# Patient Record
Sex: MALE | Race: BLACK OR AFRICAN AMERICAN | Employment: OTHER | URBAN - METROPOLITAN AREA
[De-identification: names, ages, dates, MRNs, and addresses within clinical notes are randomized per-mention and may not be internally consistent; named-entity substitution may affect disease eponyms.]

---

## 2017-01-06 RX ORDER — SODIUM CHLORIDE 9 MG/ML
20 INJECTION, SOLUTION INTRAVENOUS CONTINUOUS
Status: CANCELLED | OUTPATIENT
Start: 2017-01-06

## 2017-01-09 ENCOUNTER — OFFICE VISIT (OUTPATIENT)
Dept: ONCOLOGY | Age: 62
End: 2017-01-09

## 2017-01-09 ENCOUNTER — HOSPITAL ENCOUNTER (OUTPATIENT)
Dept: ONCOLOGY | Age: 62
Discharge: HOME | End: 2017-01-09

## 2017-01-09 VITALS
TEMPERATURE: 98 F | WEIGHT: 197 LBS | DIASTOLIC BLOOD PRESSURE: 90 MMHG | SYSTOLIC BLOOD PRESSURE: 155 MMHG | BODY MASS INDEX: 27.58 KG/M2 | HEART RATE: 78 BPM | HEIGHT: 71 IN

## 2017-01-09 DIAGNOSIS — G89.3 CHRONIC PAIN DUE TO NEOPLASM: ICD-10-CM

## 2017-01-09 DIAGNOSIS — R63.0 LOSS OF APPETITE: ICD-10-CM

## 2017-01-09 DIAGNOSIS — C22.0 CANCER, HEPATOCELLULAR (HCC): ICD-10-CM

## 2017-01-09 DIAGNOSIS — C22.0 CANCER, HEPATOCELLULAR (HCC): Primary | ICD-10-CM

## 2017-01-09 DIAGNOSIS — C22.8 PRIMARY MALIGNANT NEOPLASM OF LIVER WITH METASTASIS FROM LIVER TO OTHER SITE (HCC): ICD-10-CM

## 2017-01-09 DIAGNOSIS — D64.9 CHRONIC ANEMIA: ICD-10-CM

## 2017-01-09 LAB
BASO+EOS+MONOS # BLD AUTO: 0.3 K/UL (ref 0–2.3)
BASO+EOS+MONOS # BLD AUTO: 8 % (ref 0.1–17)
DIFFERENTIAL METHOD BLD: ABNORMAL
ERYTHROCYTE [DISTWIDTH] IN BLOOD BY AUTOMATED COUNT: 16.6 % (ref 11.5–14.5)
HCT VFR BLD AUTO: 32.8 % (ref 36–48)
HGB BLD-MCNC: 9.6 G/DL (ref 12–16)
LYMPHOCYTES # BLD AUTO: 15 % (ref 14–44)
LYMPHOCYTES # BLD: 0.6 K/UL (ref 1.1–5.9)
MCH RBC QN AUTO: 24.1 PG (ref 25–35)
MCHC RBC AUTO-ENTMCNC: 29.3 G/DL (ref 31–37)
MCV RBC AUTO: 82.4 FL (ref 78–102)
NEUTS SEG # BLD: 3 K/UL (ref 1.8–9.5)
NEUTS SEG NFR BLD AUTO: 77 % (ref 40–70)
PLATELET # BLD AUTO: 153 K/UL (ref 140–440)
RBC # BLD AUTO: 3.98 M/UL (ref 4.1–5.1)
WBC # BLD AUTO: 3.9 K/UL (ref 4.5–13)

## 2017-01-09 RX ORDER — FENTANYL 100 UG/H
1 PATCH TRANSDERMAL
Qty: 10 PATCH | Refills: 0 | Status: SHIPPED | OUTPATIENT
Start: 2017-01-09 | End: 2017-02-09 | Stop reason: SDUPTHER

## 2017-01-09 RX ORDER — FENTANYL 50 UG/1
1 PATCH TRANSDERMAL
Qty: 10 PATCH | Refills: 0 | Status: SHIPPED | OUTPATIENT
Start: 2017-01-09 | End: 2017-02-09 | Stop reason: SDUPTHER

## 2017-01-09 RX ORDER — HYDROMORPHONE HYDROCHLORIDE 4 MG/1
4 TABLET ORAL
Qty: 180 TAB | Refills: 0 | Status: SHIPPED | OUTPATIENT
Start: 2017-01-09 | End: 2017-02-09 | Stop reason: SDUPTHER

## 2017-01-09 NOTE — PROGRESS NOTES
Hematology/Oncology  Progress Note    Name: Rivas Enrique  Date: 2017  : 1955    PCP: Scar Bourne MD     Mr. Caridad Dykes is a 59 y.o.  male who was seen for management of his progressive hepatocellular carcinoma. Current therapy: Supportive care and radiofrequency ablation      Subjective:      Mr. Brie Schrader is a 69-year-old -American man who has slowly progressive advanced hepatocellular carcinoma. He has previously been treated with Nexavar. This medicine was discontinued due to the progressive decline in his cardiac function. I recently referred him to the interventional radiologist to see if he would be a candidate for radiofrequency ablation. He has had one treatment. His subsequent treatments have been rescheduled. Today he is complaining of progressive pain. He had been using 150 µg of Duragesic. The patient reports that he is out of his pain medication and is requesting a new prescription for his Duragesic and for his Dilaudid tablets which he takes for breakthrough pain as well. Additionally, the patient reports that his appetite is poor but readily admits that he has not been using any nutritional supplements. Past medical history, family history, and social history: these were reviewed and remains unchanged.     Past Medical History   Diagnosis Date    Acid reflux     Angina effort ([address] 10/20/2015     rare, sply when misses meds and BP high     Cancer, hepatocellular ([address] 3/6/2015    Coronary artery disease     Coronary atherosclerosis of native coronary artery      RCA PCI, NOT ON STATIN DUE TO HEP C AND ELEVATED LFT    Essential hypertension     Essential hypertension, benign      ELEVATED/PT HAS NOT TAKEN MEDS TODAY    Other and unspecified angina pectoris ([address]      STABLE NOW, FEELS BETTER    Postsurgical percutaneous transluminal coronary angioplasty status      POST RCA JAMES/2011, D/C EFFIENT    Type II or unspecified type diabetes mellitus without mention of complication, not stated as uncontrolled      Past Surgical History   Procedure Laterality Date    Hx coronary stent placement  2006     RCA STENT, FOR SINGLE VESSEL CAD    Hx coronary stent placement  2011     RCA: JAMES    Hx tonsillectomy      Hx heart catheterization       Social History     Social History    Marital status: SINGLE     Spouse name: N/A    Number of children: N/A    Years of education: N/A     Occupational History    Not on file. Social History Main Topics    Smoking status: Former Smoker     Quit date: 4/23/2006    Smokeless tobacco: Never Used      Comment: REMOTELY QUIT TOBACCO USE    Alcohol use No      Comment: REMOTELY QUIT ALCHOL USE    Drug use: No    Sexual activity: Not on file     Other Topics Concern    Not on file     Social History Narrative     Family History   Problem Relation Age of Onset    Heart Disease Father      MI    Heart Attack Father 79    Hypertension Other     Diabetes Other     Stroke Neg Hx      Current Outpatient Prescriptions   Medication Sig Dispense Refill    isosorbide mononitrate ER (IMDUR) 60 mg CR tablet Take 1 Tab by mouth every morning. 30 Tab 3    fentaNYL (DURAGESIC) 100 mcg/hr PATCH 1 Patch by TransDERmal route every seventy-two (72) hours. Max Daily Amount: 1 Patch. 5 Patch 0    fentaNYL (DURAGESIC) 50 mcg/hr PATCH 1 Patch by TransDERmal route every seventy-two (72) hours. Max Daily Amount: 1 Patch. Use 1- 100 µg patch in combination with 1-50 µg patch for a total dose of 150 µg every 72 hours 5 Patch 0    HYDROmorphone (DILAUDID) 4 mg tablet Take 1 Tab by mouth every four (4) hours as needed for Pain. Max Daily Amount: 24 mg. 180 Tab 0    hydrALAZINE (APRESOLINE) 50 mg tablet Take 1 Tab by mouth two (2) times a day. Hold if SBP<120 60 Tab 3    metoprolol tartrate (LOPRESSOR) 100 mg IR tablet Take 1 Tab by mouth two (2) times a day.  60 Tab 3    clopidogrel (PLAVIX) 75 mg tablet TAKE 1 TABLET (75 MG) BY ORAL ROUTE ONCE DAILY 30 Tab 11    aspirin delayed-release 81 mg tablet 162 mg.  b complex-vitamin c-folic acid (NEPHROCAPS) 1 mg capsule Take 1 Cap by Mouth Once a Day.  oxyCODONE-acetaminophen (PERCOCET) 5-325 mg per tablet Take 1 Tab by Mouth Every 4 Hours As Needed for Pain. Do not exceed 4000 mg of acetaminophen per day.  ezetimibe (ZETIA) 10 mg tablet 10 mg.      gabapentin (NEURONTIN) 300 mg capsule 600 mg.  hydroCHLOROthiazide (HYDRODIURIL) 50 mg tablet 50 mg.      isosorbide mononitrate ER (IMDUR) 60 mg CR tablet 60 mg.      losartan (COZAAR) 100 mg tablet 100 mg.      NIFEdipine ER (PROCARDIA XL) 60 mg ER tablet 60 mg.      oxyCODONE IR (OXY-IR) 15 mg immediate release tablet 15 mg.  pantoprazole (PROTONIX) 40 mg tablet 40 mg.      SORAfenib (NEXAVAR) 200 mg tablet 200 mg.  penicillin v potassium (VEETID) 500 mg tablet   0    fentaNYL (DURAGESIC) 25 mcg/hr PATCH 1 Patch by TransDERmal route every seventy-two (72) hours. Max Daily Amount: 1 Patch. 10 Patch 0    HYDROmorphone (DILAUDID) 2 mg tablet Take 1 Tab by mouth every four (4) hours as needed for Pain. Max Daily Amount: 12 mg. 180 Tab 0    HYDROmorphone (DILAUDID) 2 mg tablet Take  by mouth every four (4) hours as needed for Pain.  cephALEXin (KEFLEX) 250 mg capsule Take 500 mg by mouth four (4) times daily.  oxyCODONE IR (ROXICODONE) 5 mg immediate release tablet Take 5 mg by mouth every four (4) hours as needed for Pain.  cloNIDine HCl (CATAPRES) 0.1 mg tablet Take 1 Tab by mouth nightly. Hold if SBP<120 30 Tab 1    aspirin delayed-release 81 mg tablet Take  by mouth daily. 2 tabs daily      nitroglycerin (NITROSTAT) 0.4 mg SL tablet 1 Tab by SubLINGual route every five (5) minutes as needed for Chest Pain for up to 3 doses. 25 Tab 0    rosuvastatin (CRESTOR) 5 mg tablet Take 1 Tab by mouth nightly. 30 Tab 6    b complex-vitamin c-folic acid (NEPHROCAPS) 1 mg capsule Take 1 Cap by mouth daily.       fentaNYL (DURAGESIC) 100 mcg/hr PATCH 1 Patch by TransDERmal route every seventy-two (72) hours.  gabapentin (NEURONTIN) 300 mg capsule Take 300 mg by mouth two (2) times a day.  NIFEDICAL XL 60 mg ER tablet TAKE 1 TABLET (60 MG) BY ORAL ROUTE ONCE DAILY 30 Tab 3    pantoprazole (PROTONIX) 40 mg tablet Take 40 mg by mouth daily. 30 minutes before breakfast      SORAfenib (NEXAVAR) 200 mg tablet Take  by mouth two (2) times a day.  losartan (COZAAR) 100 mg tablet Take 1 Tab by mouth daily. 30 Tab 6    ezetimibe (ZETIA) 10 mg tablet Take  by mouth.  hydrochlorothiazide (HYDRODIURIL) 50 mg tablet Take 50 mg by mouth daily. Review of Systems  Constitutional: The patient has complaints of some fatigue and weakness due to the cancer associated pain. HEENT: The patient denies recent head trauma, eye pain, blurred vision,  hearing deficit, oropharyngeal mucosal pain or lesions, and the patient denies throat pain or discomfort. Lymphatics: The patient denies palpable peripheral lymphadenopathy. Hematologic: The patient denies having bruising, bleeding, or progressive fatigue. Respiratory: Patient denies having shortness of breath, cough, sputum production, fever, or dyspnea on exertion. Cardiovascular: The patient denies having leg pain, leg swelling, heart palpitations, chest permit, chest pain, or lightheadedness. The patient denies having dyspnea on exertion. Gastrointestinal: The patient denies having nausea, emesis, or diarrhea. The patient denies having any hematemesis or blood in the stool. He is experiencing slowly progressive abdominal pain due to the progressive nature of his advanced hepatocellular carcinoma. Genitourinary: Patient denies having urinary urgency, frequency, or dysuria. The patient denies having blood in the urine. Psychological: The patient denies having symptoms of nervousness, anxiety, depression, or thoughts of harming self.   Skin: Patient denies having skin rashes, skin, ulcerations, or unexplained itching or pruritus. Musculoskeletal: The patient denies having pain in the joints or bones. Objective:     Visit Vitals    /90    Pulse 78    Temp 98 °F (36.7 °C)    Ht 5' 11\" (1.803 m)    Wt 89.4 kg (197 lb)    BMI 27.48 kg/m2     ECOG PS=1, Pain score=4/10  Physical Exam:   Gen. Appearance: The patient is in no acute distress. Skin: There is no bruise or rash. HEENT: The exam is unremarkable. Neck: Supple without lymphadenopathy or thyromegaly. Lungs: Clear to auscultation and percussion; there are no wheezes or rhonchi. Heart: Regular rate and rhythm; there are no murmurs, gallops, or rubs. Abdomen: Bowel sounds are present and normal.  There is no guarding, tenderness, or hepatosplenomegaly. Extremities: There is no clubbing, cyanosis, or edema. Neurologic: There are no focal neurologic deficits. Lymphatics: There is no palpable peripheral lymphadenopathy. Musculoskeletal: The patient has full range of motion at all joints. There is no evidence of joint deformity or effusions. There is no focal joint tenderness. Psychological/psychiatric: There is no clinical evidence of anxiety, depression, or melancholy. Lab data:      Results for orders placed or performed during the hospital encounter of 01/09/17   CBC WITH 3 PART DIFF     Status: Abnormal   Result Value Ref Range Status    WBC 3.9 (L) 4.5 - 13.0 K/uL Final    RBC 3.98 (L) 4.10 - 5.10 M/uL Final    HGB 9.6 (L) 12.0 - 16.0 g/dL Final    HCT 32.8 (L) 36 - 48 % Final    MCV 82.4 78 - 102 FL Final    MCH 24.1 (L) 25.0 - 35.0 PG Final    MCHC 29.3 (L) 31 - 37 g/dL Final    RDW 16.6 (H) 11.5 - 14.5 % Final    PLATELET 773 978 - 383 K/uL Final    NEUTROPHILS 77 (H) 40 - 70 % Final    MIXED CELLS 8 0.1 - 17 % Final    LYMPHOCYTES 15 14 - 44 % Final    ABS. NEUTROPHILS 3.0 1.8 - 9.5 K/UL Final    ABS. MIXED CELLS 0.3 0.0 - 2.3 K/uL Final    ABS.  LYMPHOCYTES 0.6 (L) 1.1 - 5.9 K/UL Final Comment: [facility] Results Reviewed by Medical Director. DF AUTOMATED   Final           Assessment:     1. Cancer, hepatocellular ([facility]    2. Primary malignant neoplasm of liver with metastasis from liver to other site [facility])    3. Chronic pain due to neoplasm    4. Chronic anemia    5. Loss of appetite      Plan:   Advanced hepatocellular carcinoma/primary malignant neoplasm of the liver with metastasis from the liver to other sites: The patient has been referred to the interventional radiologist and is currently undergoing radiofrequency ablation. He did complete one of his treatment and subsequent treatments have been rescheduled. I will check his comprehensive metabolic panel and alpha-fetoprotein levels at this time. I have encouraged the patient to complete the treatment as recommended by the interventional radiologist.    Cancer associated pain: At this time I will provide him with 150 µg every 72 hours of Duragesic. Dilaudid 4 mg tablets will be provided at this time with instruction to take 1 tablet every 4 hours when necessary as needed as well. Chronic anemia: Have explained to the patient that his CBC today shows that his hemoglobin is 9.6 g/dL with hematocrit of 32.8%. I will check his iron profile and ferritin levels at this time. Pending the results of this test the patient may need to begin intravenous iron therapy. Loss of appetite: I have explained to the patient that he should begin using nutritional supplements between meals such as boost or Ensure. If over the next 6 weeks there is no significant improvement we will at Marinol 5 mg p.o. twice daily to his treatment regimen. Follow-up will occur in 6 weeks.   Orders Placed This Encounter    COMPLETE CBC & AUTO DIFF WBC    InHouse CBC ([place])     Standing Status:   Future     Number of Occurrences:   1     Standing Expiration Date:   1/16/2017    FERRITIN     Standing Status: Future     Standing Expiration Date:   0/66/0891    METABOLIC PANEL, COMPREHENSIVE     Standing Status:   Future     Standing Expiration Date:   1/10/2018    IRON PROFILE     Standing Status:   Future     Standing Expiration Date:   1/10/2018    AFP, TUMOR MARKER     Standing Status:   Future     Standing Expiration Date:   1/10/2018     Order Specific Question:   Patient Race? Answer:   Black     Order Specific Question:   Patient Weight     Answer:   89.4       Batool Modi MD  1/9/2017      Please note: This document has been produced using voice recognition software. Unrecognized errors in transcription may be present.

## 2017-01-09 NOTE — MR AVS SNAPSHOT
Visit Information Date & Time Provider Department Dept. Phone Encounter #  
 1/9/2017  2:30 PM Reji Lopez MD [facility] [phone] Follow-up Instructions Return in about 6 weeks (around 2/20/2017). Your Appointments 1/24/2017  2:30 PM  
ESTABLISHED PATIENT with Murphy Sandhu MD  
[facility] Appt Note: 3 month follow up with Dr Naima Murphy; 3 month follow up with Dr Merline Donna r/s weather [address] [facility] [address]  
  
   
 [address] [phone] [place] [address]  
  
    
 2/20/2017  2:45 PM  
Office Visit with Reji Lopez MD  
[address] [place] [address]) Appt Note: 6 wk fu  
 [address], [place] [address] [facility] [address] [facility]  
  
   
 [facility] [address] [facility] Upcoming Health Maintenance Date Due HEMOGLOBIN A1C Q6M 1955 FOOT EXAM Q1 9/9/1963 MICROALBUMIN Q1 9/9/1963 EYE EXAM RETINAL OR DILATED Q1 9/9/1963 Pneumococcal 19-64 Highest Risk (1 of 3 - PCV13) 9/9/1972 DTaP/Tdap/Td series (1 - Tdap) 9/9/1974 FOBT Q 1 YEAR AGE 50-75 9/9/2003 ZOSTER VACCINE AGE 60> 9/9/2013 LIPID PANEL Q1 4/25/2015 INFLUENZA AGE 9 TO ADULT 8/1/2016 Allergies as of 1/9/2017  Review Complete On: 12/13/2016 By: Karis Rizo Severity Noted Reaction Type Reactions Lipitor [Atorvastatin]    Unable to Obtain Other reaction(s): other/intolerance Other reaction(s): other/intolerance Current Immunizations  Never Reviewed No immunizations on file. Not reviewed this visit You Were Diagnosed With   
  
 Codes Comments Cancer, hepatocellular ([facility]    -  Primary ICD-10-CM: C22.0 ICD-9-CM: 155.0 Primary malignant neoplasm of liver with metastasis from liver to other site [facility])     ICD-10-CM: C22.8 ICD-9-CM: 155.0 Chronic pain due to neoplasm     ICD-10-CM: G89.3 ICD-9-CM: 338. 3 Chronic anemia     ICD-10-CM: D64.9 ICD-9-CM: 285.9 Loss of appetite     ICD-10-CM: R63.0 ICD-9-CM: 897. 0 Vitals BP Pulse Temp Height(growth percentile) Weight(growth percentile) BMI  
 155/90 78 98 °F (36.7 °C) 5' 11\" (1.803 m) 197 lb (89.4 kg) 27.48 kg/m2 Smoking Status Former Smoker BMI and BSA Data Body Mass Index Body Surface Area  
 27.48 kg/m 2 2.12 m 2 Preferred Pharmacy Pharmacy Name Phone [id number] [address] [phone] Your Updated Medication List  
  
   
This list is accurate as of: 1/9/17  3:09 PM.  Always use your most recent med list.  
  
  
  
  
 * aspirin delayed-release 81 mg tablet Take  by mouth daily. 2 tabs daily * aspirin delayed-release 81 mg tablet 162 mg.  
  
 * b complex-vitamin c-folic acid 1 mg capsule Commonly known as:  Sharon Sacks Take 1 Cap by mouth daily. * b complex-vitamin c-folic acid 1 mg capsule Commonly known as:  Sharon Sacks Take 1 Cap by Mouth Once a Day. cephALEXin 250 mg capsule Commonly known as:  Gray Balboa Take 500 mg by mouth four (4) times daily. cloNIDine HCl 0.1 mg tablet Commonly known as:  CATAPRES Take 1 Tab by mouth nightly. Hold if SBP<120  
  
 clopidogrel 75 mg Tab Commonly known as:  PLAVIX TAKE 1 TABLET (75 MG) BY ORAL ROUTE ONCE DAILY * fentaNYL 100 mcg/hr PATCH Commonly known as:  DURAGESIC  
1 Patch by TransDERmal route every seventy-two (72) hours. * fentaNYL 25 mcg/hr PATCH Commonly known as:  DURAGESIC  
1 Patch by TransDERmal route every seventy-two (72) hours. Max Daily Amount: 1 Patch. * fentaNYL 100 mcg/hr PATCH Commonly known as:  DURAGESIC  
1 Patch by TransDERmal route every seventy-two (72) hours. Max Daily Amount: 1 Patch.   
  
 * fentaNYL 50 mcg/hr PATCH  
 Commonly known as:  DURAGESIC  
1 Patch by TransDERmal route every seventy-two (72) hours. Max Daily Amount: 1 Patch. Use 1- 100 g patch in combination with 1-50 g patch for a total dose of 150 g every 72 hours * gabapentin 300 mg capsule Commonly known as:  NEURONTIN Take 300 mg by mouth two (2) times a day. * gabapentin 300 mg capsule Commonly known as:  NEURONTIN  
600 mg.  
  
 hydrALAZINE 50 mg tablet Commonly known as:  APRESOLINE Take 1 Tab by mouth two (2) times a day. Hold if SBP<120  
  
 * hydroCHLOROthiazide 50 mg tablet Commonly known as:  HYDRODIURIL Take 50 mg by mouth daily. * hydroCHLOROthiazide 50 mg tablet Commonly known as:  HYDRODIURIL  
50 mg.  
  
 * HYDROmorphone 2 mg tablet Commonly known as:  DILAUDID Take  by mouth every four (4) hours as needed for Pain. * HYDROmorphone 2 mg tablet Commonly known as:  DILAUDID Take 1 Tab by mouth every four (4) hours as needed for Pain. Max Daily Amount: 12 mg.  
  
 * HYDROmorphone 4 mg tablet Commonly known as:  DILAUDID Take 1 Tab by mouth every four (4) hours as needed for Pain. Max Daily Amount: 24 mg.  
  
 * isosorbide mononitrate ER 60 mg CR tablet Commonly known as:  IMDUR  
60 mg.  
  
 * isosorbide mononitrate ER 60 mg CR tablet Commonly known as:  IMDUR Take 1 Tab by mouth every morning. * losartan 100 mg tablet Commonly known as:  COZAAR  
100 mg.  
  
 * losartan 100 mg tablet Commonly known as:  COZAAR Take 1 Tab by mouth daily. metoprolol tartrate 100 mg IR tablet Commonly known as:  LOPRESSOR Take 1 Tab by mouth two (2) times a day. * NIFEDICAL XL 60 mg ER tablet Generic drug:  NIFEdipine ER  
TAKE 1 TABLET (60 MG) BY ORAL ROUTE ONCE DAILY  
  
 * NIFEdipine ER 60 mg ER tablet Commonly known as:  PROCARDIA XL  
60 mg.  
  
 nitroglycerin 0.4 mg SL tablet Commonly known as:  NITROSTAT 1 Tab by SubLINGual route every five (5) minutes as needed for Chest Pain for up to 3 doses. * oxyCODONE IR 5 mg immediate release tablet Commonly known as:  Ralph Fuelling Take 5 mg by mouth every four (4) hours as needed for Pain. * oxyCODONE IR 15 mg immediate release tablet Commonly known as:  OXY-IR  
15 mg.  
  
 * pantoprazole 40 mg tablet Commonly known as:  PROTONIX Take 40 mg by mouth daily. 30 minutes before breakfast  
  
 * pantoprazole 40 mg tablet Commonly known as:  PROTONIX  
40 mg.  
  
 penicillin v potassium 500 mg tablet Commonly known as:  VEETID PERCOCET 5-325 mg per tablet Generic drug:  oxyCODONE-acetaminophen Take 1 Tab by Mouth Every 4 Hours As Needed for Pain. Do not exceed 4000 mg of acetaminophen per day. rosuvastatin 5 mg tablet Commonly known as:  CRESTOR Take 1 Tab by mouth nightly. * SORAfenib 200 mg tablet Commonly known as:  Bo Pennant Take  by mouth two (2) times a day. * SORAfenib 200 mg tablet Commonly known as:  NEXAVAR  
200 mg.  
  
 * ZETIA 10 mg tablet Generic drug:  ezetimibe Take  by mouth. * ezetimibe 10 mg tablet Commonly known as:  [place] Carbo 10 mg.  
  
 * Notice: This list has 29 medication(s) that are the same as other medications prescribed for you. Read the directions carefully, and ask your doctor or other care provider to review them with you. We Performed the Following COMPLETE CBC & AUTO DIFF WBC [47346 CPT(R)] Follow-up Instructions Return in about 6 weeks (around 2/20/2017). To-Do List   
 01/09/2017 Lab:  AFP, TUMOR MARKER   
  
 01/09/2017 Lab:  CBC WITH 3 PART DIFF   
  
 01/09/2017 Lab:  FERRITIN   
  
 01/09/2017 Lab:  IRON PROFILE   
  
 01/09/2017 Lab:  METABOLIC PANEL, COMPREHENSIVE   
  
 01/10/2017 6:00 AM  
  Appointment with [address] at [facility]  [place] [facility] ([phone]) MEDICATIONS  - Patient must bring a complete list of all medications currently taking to include prescriptions, over-the-counter meds, herbals, vitamins & any dietary supplements 01/10/2017  9:00 AM  
  Appointment with [facility] CATH HOLDING; [facility] ANGIO RADIOLOGIST; [facility] IR RM 1 at [facility] RAD [address] IR ([phone]) DIET INSTRUCTIONS - NPO - Nothing to eat or drink after midnight before the day of your exam.  You may take small sips of water with your medications unless instructed otherwise. GENERAL INSTRUCTIONS - You must have someone to drive you home after the procedure, unless instructed otherwise. MEDICATIONS - Bring a complete list of all your medications including prescriptions, over-the-counter meds, herbals, vitamins & dietary supplements. QUESTIONS Notify the Angio department in which your are scheduled. -Elia Fuller. #[address] [facility] Jose Marie [phone] Introducing [facility]! Conchita Yepez introduces [employer] patient portal. Now you can access parts of your medical record, email your doctor's office, and request medication refills online. 1. In your internet browser, go to [email or web address] 2. Click on the First Time User? Click Here link in the Sign In box. You will see the New Member Sign Up page. 3. Enter your [employer] Access Code exactly as it appears below. You will not need to use this code after youve completed the sign-up process. If you do not sign up before the expiration date, you must request a new code. · [employer] Access Code: [email or web address] Expires: 2/16/2017 12:42 PM 
 
4. Enter the last four digits of your Social Security Number (xxxx) and Date of Birth (mm/dd/yyyy) as indicated and click Submit. You will be taken to the next sign-up page. 5. Create a [employer] ID. This will be your [employer] login ID and cannot be changed, so think of one that is secure and easy to remember. 6. Create a [employer] password. You can change your password at any time. 7. Enter your Password Reset Question and Answer. This can be used at a later time if you forget your password. 8. Enter your e-mail address. You will receive e-mail notification when new information is available in [address] 9. Click Sign Up. You can now view and download portions of your medical record. 10. Click the Download Summary menu link to download a portable copy of your medical information. If you have questions, please visit the Frequently Asked Questions section of the [employer] website. Remember, [employer] is NOT to be used for urgent needs. For medical emergencies, dial 911. Now available from your iPhone and Android! Please provide this summary of care documentation to your next provider. Your primary care clinician is listed as Morris Alvarez. If you have any questions after today's visit, please call [phone].

## 2017-01-10 ENCOUNTER — HOSPITAL ENCOUNTER (OUTPATIENT)
Dept: INTERVENTIONAL RADIOLOGY/VASCULAR | Age: 62
Discharge: HOME | End: 2017-01-10
Attending: RADIOLOGY | Admitting: RADIOLOGY

## 2017-01-10 ENCOUNTER — APPOINTMENT (OUTPATIENT)
Dept: NUCLEAR MEDICINE | Age: 62
End: 2017-01-10
Attending: RADIOLOGY

## 2017-01-10 DIAGNOSIS — C22.0 LIVER CARCINOMA (HCC): ICD-10-CM

## 2017-01-10 LAB
AFP-TM SERPL-MCNC: 31.7 NG/ML (ref 0–8.3)
ALBUMIN SERPL-MCNC: 4 G/DL (ref 3.6–4.8)
ALBUMIN/GLOB SERPL: 1 {RATIO} (ref 1.1–2.5)
ALP SERPL-CCNC: 75 IU/L (ref 39–117)
ALT SERPL-CCNC: 22 IU/L (ref 0–44)
AST SERPL-CCNC: 45 IU/L (ref 0–40)
BILIRUB SERPL-MCNC: 0.3 MG/DL (ref 0–1.2)
BUN SERPL-MCNC: 19 MG/DL (ref 8–27)
BUN/CREAT SERPL: 21 (ref 10–22)
CALCIUM SERPL-MCNC: 9.4 MG/DL (ref 8.6–10.2)
CHLORIDE SERPL-SCNC: 97 MMOL/L (ref 96–106)
CO2 SERPL-SCNC: 28 MMOL/L (ref 18–29)
CREAT SERPL-MCNC: 0.9 MG/DL (ref 0.76–1.27)
FERRITIN SERPL-MCNC: 91 NG/ML (ref 30–400)
GLOBULIN SER CALC-MCNC: 4.1 G/DL (ref 1.5–4.5)
GLUCOSE SERPL-MCNC: 95 MG/DL (ref 65–99)
IRON SATN MFR SERPL: 11 % (ref 15–55)
IRON SERPL-MCNC: 34 UG/DL (ref 38–169)
POTASSIUM SERPL-SCNC: 4.8 MMOL/L (ref 3.5–5.2)
PROT SERPL-MCNC: 8.1 G/DL (ref 6–8.5)
SODIUM SERPL-SCNC: 139 MMOL/L (ref 134–144)
TIBC SERPL-MCNC: 321 UG/DL (ref 250–450)
UIBC SERPL-MCNC: 287 UG/DL (ref 111–343)

## 2017-01-10 NOTE — H&P
OUTPATIENT HISTORY AND PHYSICAL      Today 1/10/2017     Indication/Symptoms:   Angelica Longoria is a 59 y.o. male here for palliative radioembolization of complex multifocal HCC. Patient however was 2 hours late and continued taking Plavix despite multiple contact calls by IR nurse. Patient also stated that he has run out of most of his pain medications. Therefore, procedure was canceled and will be rescheduled with appropriate  Plavix washout and pain control. Patient agreed and understood the above plan. Current Meds:    Prior to Admission medications    Medication Sig Start Date End Date Taking? Authorizing Provider   fentaNYL (DURAGESIC) 50 mcg/hr PATCH 1 Patch by TransDERmal route every seventy-two (72) hours. Max Daily Amount: 1 Patch. Use 1- 100 µg patch in combination with 1-50 µg patch for a total dose of 150 µg every 72 hours 1/9/17   Mode Ramirez MD   fentaNYL (DURAGESIC) 100 mcg/hr PATCH 1 Patch by TransDERmal route every seventy-two (72) hours. Max Daily Amount: 1 Patch. 1/9/17   Mode Ramirez MD   HYDROmorphone (DILAUDID) 4 mg tablet Take 1 Tab by mouth every four (4) hours as needed for Pain. Max Daily Amount: 24 mg. 1/9/17   Mode Ramirez MD   isosorbide mononitrate ER (IMDUR) 60 mg CR tablet Take 1 Tab by mouth every morning. 12/29/16   Margaret Logan NP   hydrALAZINE (APRESOLINE) 50 mg tablet Take 1 Tab by mouth two (2) times a day. Hold if SBP<120 11/30/16   Shawna Gallegos MD   metoprolol tartrate (LOPRESSOR) 100 mg IR tablet Take 1 Tab by mouth two (2) times a day. 11/30/16   Shawna Gallegos MD   clopidogrel (PLAVIX) 75 mg tablet TAKE 1 TABLET (75 MG) BY ORAL ROUTE ONCE DAILY 10/26/16   Mera Altamirano MD   aspirin delayed-release 81 mg tablet 162 mg. Historical Provider   b complex-vitamin c-folic acid (NEPHROCAPS) 1 mg capsule Take 1 Cap by Mouth Once a Day.     Historical Provider   oxyCODONE-acetaminophen (PERCOCET) 5-325 mg per tablet Take 1 Tab by Mouth Every 4 Hours As Needed for Pain. Do not exceed 4000 mg of acetaminophen per day. 9/7/16   Historical Provider   ezetimibe (ZETIA) 10 mg tablet 10 mg. Historical Provider   gabapentin (NEURONTIN) 300 mg capsule 600 mg. Historical Provider   hydroCHLOROthiazide (HYDRODIURIL) 50 mg tablet 50 mg. 6/20/16   Historical Provider   isosorbide mononitrate ER (IMDUR) 60 mg CR tablet 60 mg. Historical Provider   losartan (COZAAR) 100 mg tablet 100 mg. Historical Provider   NIFEdipine ER (PROCARDIA XL) 60 mg ER tablet 60 mg. 6/20/16   Historical Provider   oxyCODONE IR (OXY-IR) 15 mg immediate release tablet 15 mg. 9/30/16   Historical Provider   pantoprazole (PROTONIX) 40 mg tablet 40 mg. Historical Provider   SORAfenib (NEXAVAR) 200 mg tablet 200 mg. Historical Provider   penicillin v potassium (VEETID) 500 mg tablet  9/7/16   Historical Provider   fentaNYL (DURAGESIC) 25 mcg/hr PATCH 1 Patch by TransDERmal route every seventy-two (72) hours. Max Daily Amount: 1 Patch. 10/24/16   Malika Rebolledo MD   HYDROmorphone (DILAUDID) 2 mg tablet Take 1 Tab by mouth every four (4) hours as needed for Pain. Max Daily Amount: 12 mg. 10/24/16   Malika Rebolledo MD   HYDROmorphone (DILAUDID) 2 mg tablet Take  by mouth every four (4) hours as needed for Pain. Historical Provider   cephALEXin (KEFLEX) 250 mg capsule Take 500 mg by mouth four (4) times daily. Historical Provider   oxyCODONE IR (ROXICODONE) 5 mg immediate release tablet Take 5 mg by mouth every four (4) hours as needed for Pain. Historical Provider   cloNIDine HCl (CATAPRES) 0.1 mg tablet Take 1 Tab by mouth nightly. Hold if SBP<120 8/23/16   Jacklyn Jara MD   aspirin delayed-release 81 mg tablet Take  by mouth daily. 2 tabs daily    Historical Provider   nitroglycerin (NITROSTAT) 0.4 mg SL tablet 1 Tab by SubLINGual route every five (5) minutes as needed for Chest Pain for up to 3 doses.  7/13/16   Jacklyn Jara MD   rosuvastatin (CRESTOR) 5 mg tablet Take 1 Tab by mouth nightly. 7/13/16   Bhupendra White MD   b complex-vitamin c-folic acid (NEPHROCAPS) 1 mg capsule Take 1 Cap by mouth daily. Historical Provider   fentaNYL (DURAGESIC) 100 mcg/hr PATCH 1 Patch by TransDERmal route every seventy-two (72) hours. Historical Provider   gabapentin (NEURONTIN) 300 mg capsule Take 300 mg by mouth two (2) times a day. Historical Provider   NIFEDICAL XL 60 mg ER tablet TAKE 1 TABLET (60 MG) BY ORAL ROUTE ONCE DAILY 5/16/16   Bhupendra White MD   pantoprazole (PROTONIX) 40 mg tablet Take 40 mg by mouth daily. 30 minutes before breakfast    Historical Provider   SORAfenib (NEXAVAR) 200 mg tablet Take  by mouth two (2) times a day. Historical Provider   losartan (COZAAR) 100 mg tablet Take 1 Tab by mouth daily. 1/11/16   Bhupendra White MD   ezetimibe (ZETIA) 10 mg tablet Take  by mouth. Historical Provider   hydrochlorothiazide (HYDRODIURIL) 50 mg tablet Take 50 mg by mouth daily. Historical Provider       Allergies:       Allergies   Allergen Reactions    Lipitor [Atorvastatin] Unable to Obtain     Other reaction(s): other/intolerance  Other reaction(s): other/intolerance       Comorbid Conditions:    Past Medical History   Diagnosis Date    Acid reflux     Angina effort ([address] 10/20/2015     rare, sply when misses meds and BP high     Cancer, hepatocellular ([address] 3/6/2015    Coronary artery disease     Coronary atherosclerosis of native coronary artery      RCA PCI, NOT ON STATIN DUE TO HEP C AND ELEVATED LFT    Essential hypertension     Essential hypertension, benign      ELEVATED/PT HAS NOT TAKEN MEDS TODAY    Other and unspecified angina pectoris ([address]      STABLE NOW, FEELS BETTER    Postsurgical percutaneous transluminal coronary angioplasty status      POST RCA JAMES/12/2011, D/C EFFIENT    Type II or unspecified type diabetes mellitus without mention of complication, not stated as uncontrolled           Past Surgical History   Procedure Laterality Date    Hx coronary stent placement  2006     RCA STENT, FOR SINGLE VESSEL CAD    Hx coronary stent placement  2011     RCA: JAMES    Hx tonsillectomy      Hx heart catheterization       Data:    There were no vitals taken for this visit.:  Recent Labs      01/09/17   1420   PLT  153     No results for input(s): INR, APTT in the last 72 hours. No lab exists for component: PT, INREXT    The H & P and/or progress notes and any available imaging were reviewed. The risks, indications and possible alternatives to the procedure, including doing nothing, were discussed and informed consent was obtained. Physical Exam:      Mental status:   Alert and oriented. Examination specific to the procedure proposed to be performed and any co morbid conditions:   Mallampati classification 3 ,  ASA2   Heart:   RRR. Lungs:   CTAB. No wheezes, rales or rhonchi. The patient is NOT  an appropriate candidate to undergo the planned procedure and sedation.     Tangela Ugalde MD

## 2017-01-16 ENCOUNTER — TELEPHONE (OUTPATIENT)
Dept: ONCOLOGY | Age: 62
End: 2017-01-16

## 2017-01-16 RX ORDER — TRAMADOL HYDROCHLORIDE 50 MG/1
50 TABLET ORAL
Qty: 120 TAB | Refills: 0 | Status: SHIPPED | OUTPATIENT
Start: 2017-01-16

## 2017-01-16 NOTE — TELEPHONE ENCOUNTER
Pt called to notify us that he went to [facility] and dropped off a urine specimen. He said he was told to let you know. If you need to contact him, he is at [phone].

## 2017-01-21 LAB
AMPHETAMINES UR QL SCN: NEGATIVE NG/ML
BARBITURATES UR QL SCN: NEGATIVE NG/ML
BENZODIAZ UR QL: NEGATIVE NG/ML
BZE UR QL: POSITIVE
CANNABINOIDS UR QL SCN: NEGATIVE NG/ML
OPIATES UR QL: NEGATIVE
PCP UR QL: NEGATIVE NG/ML
SPECIMEN STATUS REPORT, ROLRST: NORMAL

## 2017-01-24 ENCOUNTER — OFFICE VISIT (OUTPATIENT)
Dept: CARDIOLOGY CLINIC | Age: 62
End: 2017-01-24

## 2017-01-24 VITALS
WEIGHT: 187 LBS | HEART RATE: 73 BPM | HEIGHT: 71 IN | BODY MASS INDEX: 26.18 KG/M2 | DIASTOLIC BLOOD PRESSURE: 83 MMHG | SYSTOLIC BLOOD PRESSURE: 137 MMHG

## 2017-01-24 DIAGNOSIS — I10 ESSENTIAL HYPERTENSION, BENIGN: ICD-10-CM

## 2017-01-24 DIAGNOSIS — C22.8 PRIMARY MALIGNANT NEOPLASM OF LIVER WITH METASTASIS FROM LIVER TO OTHER SITE (HCC): ICD-10-CM

## 2017-01-24 DIAGNOSIS — E78.00 PURE HYPERCHOLESTEROLEMIA: ICD-10-CM

## 2017-01-24 DIAGNOSIS — R07.89 OTHER CHEST PAIN: Primary | ICD-10-CM

## 2017-01-24 DIAGNOSIS — I47.1 SVT (SUPRAVENTRICULAR TACHYCARDIA) (HCC): ICD-10-CM

## 2017-01-24 DIAGNOSIS — I25.119 ATHEROSCLEROSIS OF NATIVE CORONARY ARTERY OF NATIVE HEART WITH ANGINA PECTORIS (HCC): ICD-10-CM

## 2017-01-24 RX ORDER — CYANOCOBALAMIN 1000 UG/ML
1000 INJECTION, SOLUTION INTRAMUSCULAR; SUBCUTANEOUS ONCE
COMMUNITY

## 2017-01-24 RX ORDER — NITROGLYCERIN 0.4 MG/1
0.4 TABLET SUBLINGUAL
Qty: 25 TAB | Refills: 0 | Status: SHIPPED | OUTPATIENT
Start: 2017-01-24 | End: 2017-01-24 | Stop reason: SDUPTHER

## 2017-01-24 RX ORDER — NITROGLYCERIN 0.4 MG/1
0.4 TABLET SUBLINGUAL
Qty: 25 TAB | Refills: 0 | Status: SHIPPED | OUTPATIENT
Start: 2017-01-24

## 2017-01-24 NOTE — MR AVS SNAPSHOT
Visit Information Date & Time Provider Department Dept. Phone Encounter #  
 1/24/2017  2:30 PM Murphy Sandhu MD [facility] [id number] Follow-up Instructions Return in about 3 months (around 4/24/2017), or if symptoms worsen or fail to improve, for post test.  
  
Your Appointments 2/20/2017  2:45 PM  
Office Visit with Reji Lopez MD  
[facility] [place] [address]) Appt Note: 6 wk fu  
 [address], Myrle Harder [address] [facility] [address] [facility], [facility] 105 200 [facility]  
  
    
 4/6/2017 11:15 AM  
ESTABLISHED PATIENT with Murphy Sandhu MD  
[facility] [place] Appt Note: 3 month follow up/Lipid/LFT  
 [address] [facility] [address]  
  
   
 [address] [address] [place] [address] Upcoming Health Maintenance Date Due HEMOGLOBIN A1C Q6M 1955 FOOT EXAM Q1 9/9/1963 MICROALBUMIN Q1 9/9/1963 EYE EXAM RETINAL OR DILATED Q1 9/9/1963 Pneumococcal 19-64 Highest Risk (1 of 3 - PCV13) 9/9/1972 DTaP/Tdap/Td series (1 - Tdap) 9/9/1974 FOBT Q 1 YEAR AGE 50-75 9/9/2003 ZOSTER VACCINE AGE 60> 9/9/2013 LIPID PANEL Q1 4/25/2015 INFLUENZA AGE 9 TO ADULT 8/1/2016 Allergies as of 1/24/2017  Review Complete On: 1/24/2017 By: Ely Morgan Severity Noted Reaction Type Reactions Lipitor [Atorvastatin]    Unable to Obtain Other reaction(s): other/intolerance Other reaction(s): other/intolerance Current Immunizations  Never Reviewed No immunizations on file. Not reviewed this visit You Were Diagnosed With   
  
 Codes Comments Other chest pain    -  Primary ICD-10-CM: R07.89 ICD-9-CM: 786.59 1/17 along with abd pain; multiple ER visits and MI r/o. Rohit due to metastatic cancer  Pure hypercholesterolemia     ICD-10-CM: E78.00 
 ICD-9-CM: 272.0 6/16 [id number]; no meds for now 
chk lipids as has lost wt Atherosclerosis of native coronary artery of native heart with angina pectoris ([facility] [address]     ICD-10-CM: I25.119 ICD-9-CM: 414.01, 413.9 CP likely nonanginal  
 Essential hypertension, benign     ICD-10-CM: I10 
ICD-9-CM: 401.1 1/17 controlled SVT (supraventricular tachycardia)     ICD-10-CM: I47.1 ICD-9-CM: 427.89 1/17 stable so far 
10/16 adnesine again successfully- 
6/16 given adenosine by paramedics with conversion to sinus rhythm. Primary malignant neoplasm of liver with metastasis from liver to other site [facility])     ICD-10-CM: C22.8 ICD-9-CM: 155.0 1/17 planning fo rradiation Vitals BP Pulse Height(growth percentile) Weight(growth percentile) BMI Smoking Status 137/83 73 5' 11\" (1.803 m) 187 lb (84.8 kg) 26.08 kg/m2 Former Smoker Vitals History BMI and BSA Data Body Mass Index Body Surface Area 26.08 kg/m 2 2.06 m 2 Preferred Pharmacy Pharmacy Name Phone [id number] [address] [phone] Your Updated Medication List  
  
   
This list is accurate as of: 1/24/17  2:56 PM.  Always use your most recent med list.  
  
  
  
  
 aspirin delayed-release 81 mg tablet Take  by mouth daily. 2 tabs daily  
  
 b complex-vitamin c-folic acid 1 mg capsule Commonly known as:  Virgilio Tabares Take 1 Cap by Mouth Once a Day. cephALEXin 250 mg capsule Commonly known as:  Shelia Maninder Take 500 mg by mouth four (4) times daily. cloNIDine HCl 0.1 mg tablet Commonly known as:  CATAPRES Take 1 Tab by mouth nightly. Hold if SBP<120  
  
 clopidogrel 75 mg Tab Commonly known as:  PLAVIX TAKE 1 TABLET (75 MG) BY ORAL ROUTE ONCE DAILY  
  
 cyanocobalamin 1,000 mcg/mL injection Commonly known as:  VITAMIN B12  
1,000 mcg by IntraMUSCular route once.  
  
 ezetimibe 10 mg tablet Commonly known as:  Beatriz Paget 10 mg. * fentaNYL 25 mcg/hr PATCH Commonly known as:  DURAGESIC  
1 Patch by TransDERmal route every seventy-two (72) hours. Max Daily Amount: 1 Patch. * fentaNYL 50 mcg/hr PATCH Commonly known as:  DURAGESIC  
1 Patch by TransDERmal route every seventy-two (72) hours. Max Daily Amount: 1 Patch. Use 1- 100 g patch in combination with 1-50 g patch for a total dose of 150 g every 72 hours * fentaNYL 100 mcg/hr PATCH Commonly known as:  DURAGESIC  
1 Patch by TransDERmal route every seventy-two (72) hours. Max Daily Amount: 1 Patch.  
  
 gabapentin 300 mg capsule Commonly known as:  NEURONTIN Take 300 mg by mouth two (2) times a day. hydrALAZINE 50 mg tablet Commonly known as:  APRESOLINE Take 1 Tab by mouth two (2) times a day. Hold if SBP<120  
  
 hydroCHLOROthiazide 50 mg tablet Commonly known as:  HYDRODIURIL  
50 mg.  
  
 * HYDROmorphone 2 mg tablet Commonly known as:  DILAUDID Take 1 Tab by mouth every four (4) hours as needed for Pain. Max Daily Amount: 12 mg.  
  
 * HYDROmorphone 4 mg tablet Commonly known as:  DILAUDID Take 1 Tab by mouth every four (4) hours as needed for Pain. Max Daily Amount: 24 mg.  
  
 isosorbide mononitrate ER 60 mg CR tablet Commonly known as:  IMDUR Take 1 Tab by mouth every morning. losartan 100 mg tablet Commonly known as:  COZAAR Take 1 Tab by mouth daily. metoprolol tartrate 100 mg IR tablet Commonly known as:  LOPRESSOR Take 1 Tab by mouth two (2) times a day. NIFEDICAL XL 60 mg ER tablet Generic drug:  NIFEdipine ER  
TAKE 1 TABLET (60 MG) BY ORAL ROUTE ONCE DAILY  
  
 nitroglycerin 0.4 mg SL tablet Commonly known as:  NITROSTAT  
1 Tab by SubLINGual route every five (5) minutes as needed for Chest Pain for up to 3 doses. oxyCODONE IR 5 mg immediate release tablet Commonly known as:  Charolet Music Take 5 mg by mouth every four (4) hours as needed for Pain.  
  
 pantoprazole 40 mg tablet Commonly known as:  PROTONIX  
40 mg.  
  
 penicillin v potassium 500 mg tablet Commonly known as:  VEETID PERCOCET 5-325 mg per tablet Generic drug:  oxyCODONE-acetaminophen Take 1 Tab by Mouth Every 4 Hours As Needed for Pain. Do not exceed 4000 mg of acetaminophen per day. rosuvastatin 5 mg tablet Commonly known as:  CRESTOR Take 1 Tab by mouth nightly. SORAfenib 200 mg tablet Commonly known as:  Erskin Royals Take  by mouth two (2) times a day. traMADol 50 mg tablet Commonly known as:  ULTRAM  
Take 1 Tab by mouth every six (6) hours as needed for Pain. Max Daily Amount: 200 mg. Indications: PAIN  
  
 * Notice: This list has 5 medication(s) that are the same as other medications prescribed for you. Read the directions carefully, and ask your doctor or other care provider to review them with you. Prescriptions Sent to Pharmacy Refills  
 nitroglycerin (NITROSTAT) 0.4 mg SL tablet 0 Si Tab by SubLINGual route every five (5) minutes as needed for Chest Pain for up to 3 doses. Class: Normal  
 Pharmacy: [id number] [address] #: [phone] Route: SubLINGual  
  
Follow-up Instructions Return in about 3 months (around 2017), or if symptoms worsen or fail to improve, for post test.  
  
To-Do List   
 Around 2017 Lab:  HEPATIC FUNCTION PANEL Around 2017 Lab:  LIPID PANEL   
  
 2017 6:00 AM  
  Appointment with [address] at [facility]  [place] [facility] ([phone]) MEDICATIONS  - Patient must bring a complete list of all medications currently taking to include prescriptions, over-the-counter meds, herbals, vitamins & any dietary supplements 2017 10:00 AM  
  Appointment with [facility] CATH HOLDING; HR RAD NO ANESTHESIA; [facility] ANGIO RADIOLOGIST; [facility] IR RM 1 at [facility] RAD [address] IR ([phone])  DIET INSTRUCTIONS - NPO - Nothing to eat or drink after midnight before the day of your exam.  You may take small sips of water with your medications unless instructed otherwise. GENERAL INSTRUCTIONS - You must have someone to drive you home after the procedure, unless instructed otherwise. MEDICATIONS - Bring a complete list of all your medications including prescriptions, over-the-counter meds, herbals, vitamins & dietary supplements. QUESTIONS Notify the Angio department in which your are scheduled. -[facility]. [address] [place] [phone] Patient Instructions Medications Discontinued During This Encounter Medication Reason  aspirin delayed-release 81 mg tablet Duplicate Order  b complex-vitamin c-folic acid (NEPHROCAPS) 1 mg capsule Duplicate Order  ezetimibe (ZETIA) 10 mg tablet Duplicate Order  fentaNYL (DURAGESIC) 100 mcg/hr PATCH Duplicate Order  gabapentin (NEURONTIN) 693 mg capsule Duplicate Order  SORAfenib (NEXAVAR) 552 mg tablet Duplicate Order  pantoprazole (PROTONIX) 40 mg tablet Duplicate Order  oxyCODONE IR (OXY-IR) 15 mg immediate release tablet Duplicate Order  hydrochlorothiazide (HYDRODIURIL) 50 mg tablet Duplicate Order  HYDROmorphone (DILAUDID) 2 mg tablet Duplicate Order  isosorbide mononitrate ER (IMDUR) 60 mg CR tablet Duplicate Order  losartan (COZAAR) 990 mg tablet Duplicate Order  NIFEdipine ER (PROCARDIA XL) 60 mg ER tablet Duplicate Order  nitroglycerin (NITROSTAT) 0.4 mg SL tablet Reorder Orders Placed This Encounter  LIPID PANEL Standing Status:   Future Standing Expiration Date:   2017  
 HEPATIC FUNCTION PANEL Standing Status:   Future Standing Expiration Date:   2017  
 nitroglycerin (NITROSTAT) 0.4 mg SL tablet Si Tab by SubLINGual route every five (5) minutes as needed for Chest Pain for up to 3 doses. Dispense:  25 Tab Refill:  0 Chest Pain: Care Instructions Your Care Instructions There are many things that can cause chest pain. Some are not serious and will get better on their own in a few days. But some kinds of chest pain need more testing and treatment. Your doctor may have recommended a follow-up visit in the next 8 to 12 hours. If you are not getting better, you may need more tests or treatment. Even though your doctor has released you, you still need to watch for any problems. The doctor carefully checked you, but sometimes problems can develop later. If you have new symptoms or if your symptoms do not get better, get medical care right away. If you have worse or different chest pain or pressure that lasts more than 5 minutes or you passed out (lost consciousness), call 911 or seek other emergency help right away. A medical visit is only one step in your treatment. Even if you feel better, you still need to do what your doctor recommends, such as going to all suggested follow-up appointments and taking medicines exactly as directed. This will help you recover and help prevent future problems. How can you care for yourself at home? · Rest until you feel better. · Take your medicine exactly as prescribed. Call your doctor if you think you are having a problem with your medicine. · Do not drive after taking a prescription pain medicine. When should you call for help? Call 911 if: 
· You passed out (lost consciousness). · You have severe difficulty breathing. · You have symptoms of a heart attack. These may include: ¨ Chest pain or pressure, or a strange feeling in your chest. 
¨ Sweating. ¨ Shortness of breath. ¨ Nausea or vomiting. ¨ Pain, pressure, or a strange feeling in your back, neck, jaw, or upper belly or in one or both shoulders or arms. ¨ Lightheadedness or sudden weakness. ¨ A fast or irregular heartbeat.  
After you call 911, the  may tell you to chew 1 adult-strength or 2 to 4 low-dose aspirin. Wait for an ambulance. Do not try to drive yourself. Call your doctor today if: 
· You have any trouble breathing. · Your chest pain gets worse. · You are dizzy or lightheaded, or you feel like you may faint. · You are not getting better as expected. · You are having new or different chest pain. Where can you learn more? Go to http://shahrzad-gume.info/. Enter A120 in the search box to learn more about \"Chest Pain: Care Instructions. \" Current as of: May 27, 2016 Content Version: 11.1 © 9419-8590 [employer]. Care instructions adapted under license by [employer] (which disclaims liability or warranty for this information). If you have questions about a medical condition or this instruction, always ask your healthcare professional. [address] any warranty or liability for your use of this information. Introducing [facility]! Alfa Garcia introduces "[employer] patient portal. Now you can access parts of your medical record, email your doctor's office, and request medication refills online. 1. In your internet browser, go to [email or web address]. [employer] 2. Click on the First Time User? Click Here link in the Sign In box. You will see the New Member Sign Up page. 3. Enter your "[employer] Access Code exactly as it appears below. You will not need to use this code after youve completed the sign-up process. If you do not sign up before the expiration date, you must request a new code. · "[employer] Access Code: [email or web address] Expires: 2/16/2017 12:42 PM 
 
4. Enter the last four digits of your Social Security Number (xxxx) and Date of Birth (mm/dd/yyyy) as indicated and click Submit. You will be taken to the next sign-up page. 5. Create a "[employer] ID. This will be your "[employer] login ID and cannot be changed, so think of one that is secure and easy to remember. 6. Create a [employer] password. You can change your password at any time. 7. Enter your Password Reset Question and Answer. This can be used at a later time if you forget your password. 8. Enter your e-mail address. You will receive e-mail notification when new information is available in [address] 9. Click Sign Up. You can now view and download portions of your medical record. 10. Click the Download Summary menu link to download a portable copy of your medical information. If you have questions, please visit the Frequently Asked Questions section of the [employer] website. Remember, [employer] is NOT to be used for urgent needs. For medical emergencies, dial 911. Now available from your iPhone and Android! Please provide this summary of care documentation to your next provider. Your primary care clinician is listed as Delma Bishop. If you have any questions after today's visit, please call [phone].

## 2017-01-24 NOTE — PROGRESS NOTES
1. Have you been to the ER, urgent care clinic since your last visit? Hospitalized since your last visit? No    2. Have you seen or consulted any other health care providers outside of the [facility] since your last visit? Include any pap smears or colon screening. Yes Where: [facility] Reason for visit: Chest Pain     3. Since your last visit, have you had any of the following symptoms? chest pains, palpitations, shortness of breath and dizziness. 4.  Have you had any blood work, X-rays or cardiac testing? Yes Where: [facility]             5.  Where do you normally have your labs drawn? [facility]    6. Do you need any refills today?    No

## 2017-01-24 NOTE — PATIENT INSTRUCTIONS
Medications Discontinued During This Encounter   Medication Reason    aspirin delayed-release 81 mg tablet Duplicate Order    b complex-vitamin c-folic acid (NEPHROCAPS) 1 mg capsule Duplicate Order    ezetimibe (ZETIA) 10 mg tablet Duplicate Order    fentaNYL (DURAGESIC) 100 mcg/hr PATCH Duplicate Order    gabapentin (NEURONTIN) 320 mg capsule Duplicate Order    SORAfenib (NEXAVAR) 195 mg tablet Duplicate Order    pantoprazole (PROTONIX) 40 mg tablet Duplicate Order    oxyCODONE IR (OXY-IR) 15 mg immediate release tablet Duplicate Order    hydrochlorothiazide (HYDRODIURIL) 50 mg tablet Duplicate Order    HYDROmorphone (DILAUDID) 2 mg tablet Duplicate Order    isosorbide mononitrate ER (IMDUR) 60 mg CR tablet Duplicate Order    losartan (COZAAR) 699 mg tablet Duplicate Order    NIFEdipine ER (PROCARDIA XL) 60 mg ER tablet Duplicate Order    nitroglycerin (NITROSTAT) 0.4 mg SL tablet Reorder       Orders Placed This Encounter    LIPID PANEL     Standing Status:   Future     Standing Expiration Date:   2017    HEPATIC FUNCTION PANEL     Standing Status:   Future     Standing Expiration Date:   2017    nitroglycerin (NITROSTAT) 0.4 mg SL tablet     Si Tab by SubLINGual route every five (5) minutes as needed for Chest Pain for up to 3 doses. Dispense:  25 Tab     Refill:  0          Chest Pain: Care Instructions  Your Care Instructions  There are many things that can cause chest pain. Some are not serious and will get better on their own in a few days. But some kinds of chest pain need more testing and treatment. Your doctor may have recommended a follow-up visit in the next 8 to 12 hours. If you are not getting better, you may need more tests or treatment. Even though your doctor has released you, you still need to watch for any problems. The doctor carefully checked you, but sometimes problems can develop later.  If you have new symptoms or if your symptoms do not get better, get medical care right away. If you have worse or different chest pain or pressure that lasts more than 5 minutes or you passed out (lost consciousness), call 911 or seek other emergency help right away. A medical visit is only one step in your treatment. Even if you feel better, you still need to do what your doctor recommends, such as going to all suggested follow-up appointments and taking medicines exactly as directed. This will help you recover and help prevent future problems. How can you care for yourself at home? · Rest until you feel better. · Take your medicine exactly as prescribed. Call your doctor if you think you are having a problem with your medicine. · Do not drive after taking a prescription pain medicine. When should you call for help? Call 911 if:  · You passed out (lost consciousness). · You have severe difficulty breathing. · You have symptoms of a heart attack. These may include:  ¨ Chest pain or pressure, or a strange feeling in your chest.  ¨ Sweating. ¨ Shortness of breath. ¨ Nausea or vomiting. ¨ Pain, pressure, or a strange feeling in your back, neck, jaw, or upper belly or in one or both shoulders or arms. ¨ Lightheadedness or sudden weakness. ¨ A fast or irregular heartbeat. After you call 911, the  may tell you to chew 1 adult-strength or 2 to 4 low-dose aspirin. Wait for an ambulance. Do not try to drive yourself. Call your doctor today if:  · You have any trouble breathing. · Your chest pain gets worse. · You are dizzy or lightheaded, or you feel like you may faint. · You are not getting better as expected. · You are having new or different chest pain. Where can you learn more? Go to http://shahrzad-gume.info/. Enter A120 in the search box to learn more about \"Chest Pain: Care Instructions. \"  Current as of: May 27, 2016  Content Version: 11.1  © 0831-7678 "[employer].  Care instructions adapted under license by [employer] (which disclaims liability or warranty for this information). If you have questions about a medical condition or this instruction, always ask your healthcare professional. [address] any warranty or liability for your use of this information.

## 2017-01-24 NOTE — PROGRESS NOTES
HISTORY OF PRESENT ILLNESS  Hamzah Peña is a 59 y.o. male. HPI Comments: 1/17 no recurrence of SVT since 10/16  10/16 recent SVT s/p adenosine  Patient with cad,old pci,htn. Cholesterol Problem   The history is provided by the medical records. This is a chronic problem. Associated symptoms include chest pain, abdominal pain and shortness of breath. Pertinent negatives include no headaches. Hypertension   The history is provided by the patient and medical records. This is a chronic problem. Associated symptoms include chest pain, abdominal pain and shortness of breath. Pertinent negatives include no headaches. Chest Pain (Angina)    The history is provided by the medical records and patient. This is a recurrent problem. The current episode started more than 1 week ago. The problem occurs rarely (mostly it is abd pain due to cancer). The pain is associated with normal activity and rest. The pain is present in the epigastric region and substernal region. Associated symptoms include abdominal pain, lower extremity edema, malaise/fatigue and shortness of breath. Pertinent negatives include no claudication, no cough, no dizziness, no fever, no headaches, no hemoptysis, no nausea, no orthopnea, no palpitations, no PND, no sputum production, no vomiting and no weakness. Treatments tried: narcotics. The treatment provided moderate relief. SVT   The history is provided by the medical records and patient. This is a recurrent (10/16) problem. Associated symptoms include chest pain, abdominal pain and shortness of breath. Pertinent negatives include no headaches. Shortness of Breath   The history is provided by the patient. This is a recurrent problem. The problem occurs frequently. The current episode started more than 1 week ago. The problem has not changed since onset. Associated symptoms include chest pain and abdominal pain.  Pertinent negatives include no fever, no headaches, no cough, no sputum production, no hemoptysis, no wheezing, no PND, no orthopnea, no vomiting, no rash, no leg swelling and no claudication. The problem's precipitants include exercise (2 blocks; not walking much; 7/16 yard length). Review of Systems   Constitutional: Positive for malaise/fatigue. Negative for chills, fever and weight loss. HENT: Negative for nosebleeds. Eyes: Negative for blurred vision, double vision and discharge. Respiratory: Positive for shortness of breath. Negative for cough, hemoptysis, sputum production and wheezing. Cardiovascular: Positive for chest pain. Negative for palpitations, orthopnea, claudication, leg swelling and PND. Gastrointestinal: Positive for abdominal pain. Negative for diarrhea, heartburn, nausea and vomiting. Genitourinary: Negative for dysuria and hematuria. Musculoskeletal: Negative for joint pain and myalgias. Skin: Negative for rash. Neurological: Negative for dizziness, seizures, loss of consciousness, weakness and headaches. Endo/Heme/Allergies: Negative for polydipsia. Does not bruise/bleed easily. Psychiatric/Behavioral: Negative for depression and substance abuse. The patient does not have insomnia. Allergies   Allergen Reactions    Lipitor [Atorvastatin] Unable to Obtain     Other reaction(s): other/intolerance  Other reaction(s): other/intolerance       Family History   Problem Relation Age of Onset    Heart Disease Father      MI    Heart Attack Father 79    Hypertension Other     Diabetes Other     Stroke Neg Hx        Social History   Substance Use Topics    Smoking status: Former Smoker     Quit date: 4/23/2006    Smokeless tobacco: Never Used      Comment: REMOTELY QUIT TOBACCO USE    Alcohol use No      Comment: REMOTELY QUIT ALCHOL USE        Current Outpatient Prescriptions   Medication Sig    cyanocobalamin (VITAMIN B12) 1,000 mcg/mL injection 1,000 mcg by IntraMUSCular route once.     fentaNYL (DURAGESIC) 50 mcg/hr PATCH 1 Patch by TransDERmal route every seventy-two (72) hours. Max Daily Amount: 1 Patch. Use 1- 100 µg patch in combination with 1-50 µg patch for a total dose of 150 µg every 72 hours    fentaNYL (DURAGESIC) 100 mcg/hr PATCH 1 Patch by TransDERmal route every seventy-two (72) hours. Max Daily Amount: 1 Patch.  HYDROmorphone (DILAUDID) 4 mg tablet Take 1 Tab by mouth every four (4) hours as needed for Pain. Max Daily Amount: 24 mg.    isosorbide mononitrate ER (IMDUR) 60 mg CR tablet Take 1 Tab by mouth every morning.  hydrALAZINE (APRESOLINE) 50 mg tablet Take 1 Tab by mouth two (2) times a day. Hold if SBP<120    metoprolol tartrate (LOPRESSOR) 100 mg IR tablet Take 1 Tab by mouth two (2) times a day.  clopidogrel (PLAVIX) 75 mg tablet TAKE 1 TABLET (75 MG) BY ORAL ROUTE ONCE DAILY    cloNIDine HCl (CATAPRES) 0.1 mg tablet Take 1 Tab by mouth nightly. Hold if SBP<120 (Patient taking differently: Take 0.1 mg by mouth two (2) times a day. Hold if SBP<120)    nitroglycerin (NITROSTAT) 0.4 mg SL tablet 1 Tab by SubLINGual route every five (5) minutes as needed for Chest Pain for up to 3 doses.  losartan (COZAAR) 100 mg tablet Take 1 Tab by mouth daily.  traMADol (ULTRAM) 50 mg tablet Take 1 Tab by mouth every six (6) hours as needed for Pain. Max Daily Amount: 200 mg. Indications: PAIN    b complex-vitamin c-folic acid (NEPHROCAPS) 1 mg capsule Take 1 Cap by Mouth Once a Day.  oxyCODONE-acetaminophen (PERCOCET) 5-325 mg per tablet Take 1 Tab by Mouth Every 4 Hours As Needed for Pain. Do not exceed 4000 mg of acetaminophen per day.  ezetimibe (ZETIA) 10 mg tablet 10 mg.    hydroCHLOROthiazide (HYDRODIURIL) 50 mg tablet 50 mg.    NIFEdipine ER (PROCARDIA XL) 60 mg ER tablet 60 mg.  pantoprazole (PROTONIX) 40 mg tablet 40 mg.  penicillin v potassium (VEETID) 500 mg tablet     fentaNYL (DURAGESIC) 25 mcg/hr PATCH 1 Patch by TransDERmal route every seventy-two (72) hours. Max Daily Amount: 1 Patch.  HYDROmorphone (DILAUDID) 2 mg tablet Take 1 Tab by mouth every four (4) hours as needed for Pain. Max Daily Amount: 12 mg.  cephALEXin (KEFLEX) 250 mg capsule Take 500 mg by mouth four (4) times daily.  oxyCODONE IR (ROXICODONE) 5 mg immediate release tablet Take 5 mg by mouth every four (4) hours as needed for Pain.  aspirin delayed-release 81 mg tablet Take  by mouth daily. 2 tabs daily    rosuvastatin (CRESTOR) 5 mg tablet Take 1 Tab by mouth nightly.  gabapentin (NEURONTIN) 300 mg capsule Take 300 mg by mouth two (2) times a day.  NIFEDICAL XL 60 mg ER tablet TAKE 1 TABLET (60 MG) BY ORAL ROUTE ONCE DAILY    SORAfenib (NEXAVAR) 200 mg tablet Take  by mouth two (2) times a day. No current facility-administered medications for this visit. Past Surgical History   Procedure Laterality Date    Hx coronary stent placement  2006     RCA STENT, FOR SINGLE VESSEL CAD    Hx coronary stent placement  2011     RCA: JAMES    Hx tonsillectomy      Hx heart catheterization         Diagnostic Studies:  CARDIOLOGY STUDIES 6/19/2016 9/4/2015 8/30/2015 2/24/2015 4/15/2014 12/1/2011 2/1/2011   EKG Result - - - - wnl - -   Myocardial Perfusion Scan Result - - - - - NL SCAN, EF 84% NL SCAN, EF 68%   Cardiac Cath Result - - - - - 95% RCA 60% PROX LCX, 70% DIAG, RCA JAMES -   Cardiac Cath with PCI Result - 80% heavy Ca, prox LAD, prox LCx JAMES - - - - -   Pharmacological Nuclear Stress Test Result WNL, 58%EF - - - - - -   Echocardiogram - Complete Result - - - 70%EF, mild DD - - -   CT Angio of Lungs Result - - neg PE - - - -       Visit Vitals    /83    Pulse 73    Ht 5' 11\" (1.803 m)    Wt 84.8 kg (187 lb)    BMI 26.08 kg/m2        April Falling has a reminder for a \"due or due soon\" health maintenance. I have asked that he contact his primary care provider for follow-up on this health maintenance. Physical Exam   Constitutional: He is oriented to person, place, and time.  He appears well-developed and well-nourished. No distress. HENT:   Head: Normocephalic and atraumatic. Mouth/Throat: Normal dentition. Eyes: Right eye exhibits no discharge. Left eye exhibits no discharge. No scleral icterus. Neck: Neck supple. No JVD present. Carotid bruit is not present. No thyromegaly present. Cardiovascular: Normal rate, regular rhythm, S1 normal, S2 normal, normal heart sounds and intact distal pulses. Exam reveals no gallop and no friction rub. No murmur heard. Pulmonary/Chest: Effort normal and breath sounds normal. He has no wheezes. He has no rales. Abdominal: Soft. He exhibits no mass. There is no tenderness. Musculoskeletal: He exhibits no edema. Lymphadenopathy:        Right cervical: No superficial cervical adenopathy present. Left cervical: No superficial cervical adenopathy present. Neurological: He is alert and oriented to person, place, and time. Skin: Skin is warm and dry. No rash noted. Psychiatric: He has a normal mood and affect. His behavior is normal.     ASSESSMENT and PLAN          Juju Catherine was seen today for cholesterol problem, hypertension and coronary artery disease. Diagnoses and all orders for this visit:    Other chest pain  Comments:  1/17 along with abd pain; multiple ER visits and MI r/o. Likley due to metastatic cancer  Orders:  -     nitroglycerin (NITROSTAT) 0.4 mg SL tablet; 1 Tab by SubLINGual route every five (5) minutes as needed for Chest Pain for up to 3 doses. Pure hypercholesterolemia  Comments:  6/16 [id number]; no meds for now  chk lipids as has lost wt   Orders:  -     LIPID PANEL; Future  -     HEPATIC FUNCTION PANEL; Future    Atherosclerosis of native coronary artery of native heart with angina pectoris ([facility] [address]  Comments:  CP likely nonanginal  Orders:  -     nitroglycerin (NITROSTAT) 0.4 mg SL tablet; 1 Tab by SubLINGual route every five (5) minutes as needed for Chest Pain for up to 3 doses.     Essential hypertension, benign  Comments:  1/17 controlled    SVT (supraventricular tachycardia)  Comments:  1/17 stable so far  10/16 adnesine again successfully-  6/16 given adenosine by paramedics with conversion to sinus rhythm. Primary malignant neoplasm of liver with metastasis from liver to other site [facility])  Comments:  1/17 planning fo rradiation        Pertinent laboratory and test data reviewed and discussed with patient.   See patient instructions also for other medical advice given    Medications Discontinued During This Encounter   Medication Reason    aspirin delayed-release 81 mg tablet Duplicate Order    b complex-vitamin c-folic acid (NEPHROCAPS) 1 mg capsule Duplicate Order    ezetimibe (ZETIA) 10 mg tablet Duplicate Order    fentaNYL (DURAGESIC) 100 mcg/hr PATCH Duplicate Order    gabapentin (NEURONTIN) 557 mg capsule Duplicate Order    SORAfenib (NEXAVAR) 047 mg tablet Duplicate Order    pantoprazole (PROTONIX) 40 mg tablet Duplicate Order    oxyCODONE IR (OXY-IR) 15 mg immediate release tablet Duplicate Order    hydrochlorothiazide (HYDRODIURIL) 50 mg tablet Duplicate Order    HYDROmorphone (DILAUDID) 2 mg tablet Duplicate Order    isosorbide mononitrate ER (IMDUR) 60 mg CR tablet Duplicate Order    losartan (COZAAR) 454 mg tablet Duplicate Order    NIFEdipine ER (PROCARDIA XL) 60 mg ER tablet Duplicate Order    nitroglycerin (NITROSTAT) 0.4 mg SL tablet Reorder       Follow-up Disposition:  Return in about 3 months (around 4/24/2017), or if symptoms worsen or fail to improve, for post test.

## 2017-01-24 NOTE — LETTER
Claude Caroli 
1955 1/24/2017 Dear MD Yvonne Santos MD 
 
I had the pleasure of evaluating  Mr. Marleen Mortimer in office today. Below are the relevant portions of my assessment and plan of care. ICD-10-CM ICD-9-CM 1. Other chest pain R07.89 786.59 nitroglycerin (NITROSTAT) 0.4 mg SL tablet 1/17 along with abd pain; multiple ER visits and MI r/o. Likley due to metastatic cancer 2. Pure hypercholesterolemia E78.00 272.0 LIPID PANEL  
   HEPATIC FUNCTION PANEL  
 6/16 [id number]; no meds for now 
chk lipids as has lost wt 3. Atherosclerosis of native coronary artery of native heart with angina pectoris ([facility] [address] I25.119 414.01 nitroglycerin (NITROSTAT) 0.4 mg SL tablet 413.9 CP likely nonanginal  
4. Essential hypertension, benign I10 401.1 1/17 controlled 5. SVT (supraventricular tachycardia) I47.1 427.89   
 1/17 stable so far 
10/16 adnesine again successfully- 
6/16 given adenosine by paramedics with conversion to sinus rhythm. 6. Primary malignant neoplasm of liver with metastasis from liver to other site [facility]) C22.8 155.0   
 1/17 planning fo rradiation Current Outpatient Prescriptions Medication Sig Dispense Refill  nitroglycerin (NITROSTAT) 0.4 mg SL tablet 1 Tab by SubLINGual route every five (5) minutes as needed for Chest Pain for up to 3 doses. 25 Tab 0  
 fentaNYL (DURAGESIC) 50 mcg/hr PATCH 1 Patch by TransDERmal route every seventy-two (72) hours. Max Daily Amount: 1 Patch. Use 1- 100 µg patch in combination with 1-50 µg patch for a total dose of 150 µg every 72 hours 10 Patch 0  
 fentaNYL (DURAGESIC) 100 mcg/hr PATCH 1 Patch by TransDERmal route every seventy-two (72) hours. Max Daily Amount: 1 Patch. 10 Patch 0  
 HYDROmorphone (DILAUDID) 4 mg tablet Take 1 Tab by mouth every four (4) hours as needed for Pain.  Max Daily Amount: 24 mg. 180 Tab 0  
 isosorbide mononitrate ER (IMDUR) 60 mg CR tablet Take 1 Tab by mouth every morning. 30 Tab 3  
 hydrALAZINE (APRESOLINE) 50 mg tablet Take 1 Tab by mouth two (2) times a day. Hold if SBP<120 60 Tab 3  
 metoprolol tartrate (LOPRESSOR) 100 mg IR tablet Take 1 Tab by mouth two (2) times a day. 60 Tab 3  clopidogrel (PLAVIX) 75 mg tablet TAKE 1 TABLET (75 MG) BY ORAL ROUTE ONCE DAILY 30 Tab 11  
 cloNIDine HCl (CATAPRES) 0.1 mg tablet Take 1 Tab by mouth nightly. Hold if SBP<120 (Patient taking differently: Take 0.1 mg by mouth two (2) times a day. Hold if SBP<120) 30 Tab 1  
 losartan (COZAAR) 100 mg tablet Take 1 Tab by mouth daily. 30 Tab 6  cyanocobalamin (VITAMIN B12) 1,000 mcg/mL injection 1,000 mcg by IntraMUSCular route once.  traMADol (ULTRAM) 50 mg tablet Take 1 Tab by mouth every six (6) hours as needed for Pain. Max Daily Amount: 200 mg. Indications: PAIN 120 Tab 0  
 b complex-vitamin c-folic acid (NEPHROCAPS) 1 mg capsule Take 1 Cap by Mouth Once a Day.  oxyCODONE-acetaminophen (PERCOCET) 5-325 mg per tablet Take 1 Tab by Mouth Every 4 Hours As Needed for Pain. Do not exceed 4000 mg of acetaminophen per day.  ezetimibe (ZETIA) 10 mg tablet 10 mg.    
 hydroCHLOROthiazide (HYDRODIURIL) 50 mg tablet 50 mg.  pantoprazole (PROTONIX) 40 mg tablet 40 mg.  penicillin v potassium (VEETID) 500 mg tablet   0  
 fentaNYL (DURAGESIC) 25 mcg/hr PATCH 1 Patch by TransDERmal route every seventy-two (72) hours. Max Daily Amount: 1 Patch. 10 Patch 0  
 HYDROmorphone (DILAUDID) 2 mg tablet Take 1 Tab by mouth every four (4) hours as needed for Pain. Max Daily Amount: 12 mg. 180 Tab 0  cephALEXin (KEFLEX) 250 mg capsule Take 500 mg by mouth four (4) times daily.  oxyCODONE IR (ROXICODONE) 5 mg immediate release tablet Take 5 mg by mouth every four (4) hours as needed for Pain.  aspirin delayed-release 81 mg tablet Take  by mouth daily. 2 tabs daily  rosuvastatin (CRESTOR) 5 mg tablet Take 1 Tab by mouth nightly. 30 Tab 6  gabapentin (NEURONTIN) 300 mg capsule Take 300 mg by mouth two (2) times a day.  NIFEDICAL XL 60 mg ER tablet TAKE 1 TABLET (60 MG) BY ORAL ROUTE ONCE DAILY 30 Tab 3  
 SORAfenib (NEXAVAR) 200 mg tablet Take  by mouth two (2) times a day. Orders Placed This Encounter  LIPID PANEL Standing Status:   Future Standing Expiration Date:   2017  
 HEPATIC FUNCTION PANEL Standing Status:   Future Standing Expiration Date:   2017  cyanocobalamin (VITAMIN B12) 1,000 mcg/mL injection Si,000 mcg by IntraMUSCular route once.  nitroglycerin (NITROSTAT) 0.4 mg SL tablet Si Tab by SubLINGual route every five (5) minutes as needed for Chest Pain for up to 3 doses. Dispense:  25 Tab Refill:  0 If you have questions, please do not hesitate to call me. I look forward to following Mr. Yudy Luna along with you. Sincerely, Dennise Parsons MD

## 2017-02-09 RX ORDER — HYDROMORPHONE HYDROCHLORIDE 4 MG/1
4 TABLET ORAL
Qty: 180 TAB | Refills: 0 | Status: SHIPPED | OUTPATIENT
Start: 2017-02-09 | End: 2017-03-06 | Stop reason: SDUPTHER

## 2017-02-09 RX ORDER — FENTANYL 100 UG/H
1 PATCH TRANSDERMAL
Qty: 10 PATCH | Refills: 0 | Status: SHIPPED | OUTPATIENT
Start: 2017-02-09 | End: 2017-03-06 | Stop reason: SDUPTHER

## 2017-02-09 RX ORDER — FENTANYL 50 UG/1
1 PATCH TRANSDERMAL
Qty: 10 PATCH | Refills: 0 | Status: SHIPPED | OUTPATIENT
Start: 2017-02-09 | End: 2017-03-06 | Stop reason: SDUPTHER

## 2017-02-27 ENCOUNTER — HOSPITAL ENCOUNTER (OUTPATIENT)
Dept: ONCOLOGY | Age: 62
Discharge: HOME | End: 2017-02-27

## 2017-02-27 ENCOUNTER — OFFICE VISIT (OUTPATIENT)
Dept: ONCOLOGY | Age: 62
End: 2017-02-27

## 2017-02-27 VITALS
HEIGHT: 71 IN | BODY MASS INDEX: 26.32 KG/M2 | WEIGHT: 188 LBS | DIASTOLIC BLOOD PRESSURE: 95 MMHG | HEART RATE: 84 BPM | SYSTOLIC BLOOD PRESSURE: 155 MMHG | TEMPERATURE: 97.6 F

## 2017-02-27 DIAGNOSIS — C22.0 CANCER, HEPATOCELLULAR (HCC): ICD-10-CM

## 2017-02-27 DIAGNOSIS — G89.3 CANCER ASSOCIATED PAIN: ICD-10-CM

## 2017-02-27 DIAGNOSIS — D64.9 CHRONIC ANEMIA: ICD-10-CM

## 2017-02-27 DIAGNOSIS — R63.0 LOSS OF APPETITE: ICD-10-CM

## 2017-02-27 DIAGNOSIS — C22.8 PRIMARY MALIGNANT NEOPLASM OF LIVER WITH METASTASIS FROM LIVER TO OTHER SITE (HCC): Primary | ICD-10-CM

## 2017-02-27 LAB
BASO+EOS+MONOS # BLD AUTO: 0.4 K/UL (ref 0–2.3)
BASO+EOS+MONOS # BLD AUTO: 9 % (ref 0.1–17)
DIFFERENTIAL METHOD BLD: ABNORMAL
ERYTHROCYTE [DISTWIDTH] IN BLOOD BY AUTOMATED COUNT: 16.6 % (ref 11.5–14.5)
HCT VFR BLD AUTO: 29.8 % (ref 36–48)
HGB BLD-MCNC: 9 G/DL (ref 12–16)
LYMPHOCYTES # BLD AUTO: 15 % (ref 14–44)
LYMPHOCYTES # BLD: 0.6 K/UL (ref 1.1–5.9)
MCH RBC QN AUTO: 24.4 PG (ref 25–35)
MCHC RBC AUTO-ENTMCNC: 30.2 G/DL (ref 31–37)
MCV RBC AUTO: 80.8 FL (ref 78–102)
NEUTS SEG # BLD: 3.1 K/UL (ref 1.8–9.5)
NEUTS SEG NFR BLD AUTO: 76 % (ref 40–70)
PLATELET # BLD AUTO: 177 K/UL (ref 140–440)
RBC # BLD AUTO: 3.69 M/UL (ref 4.1–5.1)
WBC # BLD AUTO: 4.1 K/UL (ref 4.5–13)

## 2017-02-27 RX ORDER — DRONABINOL 5 MG/1
5 CAPSULE ORAL 2 TIMES DAILY
Qty: 60 CAP | Refills: 3 | Status: SHIPPED | OUTPATIENT
Start: 2017-02-27

## 2017-02-27 NOTE — MR AVS SNAPSHOT
Visit Information Date & Time Provider Department Dept. Phone Encounter #  
 2/27/2017  2:15 PM Tani Gruber, [address] [place] [address] Oncology [phone] [id number] Follow-up Instructions Return in about 1 month (around 3/27/2017). Your Appointments 3/27/2017  2:15 PM  
Office Visit with Tani Gruber MD  
[address] [place] [address] Oncology [address] [facility]) Appt Note: 1 month follow upappointment [address], [address] 020 [facility] [address] [facility], [facility] 105 200 [facility]  
  
    
 4/6/2017 11:15 AM  
ESTABLISHED PATIENT with Bhupendra White MD  
[facility] ([address] [facility]) Appt Note: 3 month follow up/Lipid/LFT  
 [address] [facility]. [facility] [address]  
  
   
 [address] [address] [place] [address] Upcoming Health Maintenance Date Due HEMOGLOBIN A1C Q6M 1955 FOOT EXAM Q1 9/9/1963 MICROALBUMIN Q1 9/9/1963 EYE EXAM RETINAL OR DILATED Q1 9/9/1963 Pneumococcal 19-64 Highest Risk (1 of 3 - PCV13) 9/9/1972 DTaP/Tdap/Td series (1 - Tdap) 9/9/1974 FOBT Q 1 YEAR AGE 50-75 9/9/2003 ZOSTER VACCINE AGE 60> 9/9/2013 LIPID PANEL Q1 4/25/2015 INFLUENZA AGE 9 TO ADULT 8/1/2016 Allergies as of 2/27/2017  Review Complete On: 1/24/2017 By: Bhupendra White MD  
  
 Severity Noted Reaction Type Reactions Lipitor [Atorvastatin]    Unable to Obtain Other reaction(s): other/intolerance Other reaction(s): other/intolerance Current Immunizations  Never Reviewed No immunizations on file. Not reviewed this visit You Were Diagnosed With   
  
 Codes Comments Primary malignant neoplasm of liver with metastasis from liver to other site [facility])    -  Primary ICD-10-CM: C22.8 ICD-9-CM: 155.0 Cancer, hepatocellular ([facility] [address]     ICD-10-CM: C22.0 ICD-9-CM: 155.0 Cancer associated pain     ICD-10-CM: G89.3 ICD-9-CM: 338.3 Loss of appetite     ICD-10-CM: R63.0 ICD-9-CM: 626. 0 Chronic anemia     ICD-10-CM: D64.9 ICD-9-CM: 973. 9 Vitals BP  
  
  
  
  
  
 (!) 155/95 Vitals History BMI and BSA Data Body Mass Index Body Surface Area  
 26.22 kg/m 2 2.07 m 2 Preferred Pharmacy Pharmacy Name Phone [address] [facility], [id number] [place] [address] Your Updated Medication List  
  
   
This list is accurate as of: 2/27/17  3:46 PM.  Always use your most recent med list.  
  
  
  
  
 aspirin delayed-release 81 mg tablet Take  by mouth daily. 2 tabs daily  
  
 b complex-vitamin c-folic acid 1 mg capsule Commonly known as:  Tennis Snide Take 1 Cap by Mouth Once a Day. cephALEXin 250 mg capsule Commonly known as:  Cory Drum Take 500 mg by mouth four (4) times daily. cloNIDine HCl 0.1 mg tablet Commonly known as:  CATAPRES Take 1 Tab by mouth nightly. Hold if SBP<120  
  
 clopidogrel 75 mg Tab Commonly known as:  PLAVIX TAKE 1 TABLET (75 MG) BY ORAL ROUTE ONCE DAILY  
  
 cyanocobalamin 1,000 mcg/mL injection Commonly known as:  VITAMIN B12  
1,000 mcg by IntraMUSCular route once. dronabinol 5 mg capsule Commonly known as:  Josesito Rona Take 1 Cap by mouth two (2) times a day. Max Daily Amount: 10 mg.  
  
 ezetimibe 10 mg tablet Commonly known as:  Lluvia Rich 10 mg.  
  
 * fentaNYL 25 mcg/hr PATCH Commonly known as:  DURAGESIC  
1 Patch by TransDERmal route every seventy-two (72) hours. Max Daily Amount: 1 Patch. * fentaNYL 100 mcg/hr PATCH Commonly known as:  DURAGESIC  
1 Patch by TransDERmal route every seventy-two (72) hours. Max Daily Amount: 1 Patch. * fentaNYL 50 mcg/hr PATCH Commonly known as:  DURAGESIC  
1 Patch by TransDERmal route every seventy-two (72) hours.  Max Daily Amount: 1 Patch. Use 1- 100 g patch in combination with 1-50 g patch for a total dose of 150 g every 72 hours  
  
 gabapentin 300 mg capsule Commonly known as:  NEURONTIN Take 300 mg by mouth two (2) times a day. hydrALAZINE 50 mg tablet Commonly known as:  APRESOLINE Take 1 Tab by mouth two (2) times a day. Hold if SBP<120  
  
 hydroCHLOROthiazide 50 mg tablet Commonly known as:  HYDRODIURIL  
50 mg.  
  
 * HYDROmorphone 2 mg tablet Commonly known as:  DILAUDID Take 1 Tab by mouth every four (4) hours as needed for Pain. Max Daily Amount: 12 mg.  
  
 * HYDROmorphone 4 mg tablet Commonly known as:  DILAUDID Take 1 Tab by mouth every four (4) hours as needed for Pain. Max Daily Amount: 24 mg.  
  
 isosorbide mononitrate ER 60 mg CR tablet Commonly known as:  IMDUR Take 1 Tab by mouth every morning. losartan 100 mg tablet Commonly known as:  COZAAR Take 1 Tab by mouth daily. metoprolol tartrate 100 mg IR tablet Commonly known as:  LOPRESSOR Take 1 Tab by mouth two (2) times a day. NIFEDICAL XL 60 mg ER tablet Generic drug:  NIFEdipine ER  
TAKE 1 TABLET (60 MG) BY ORAL ROUTE ONCE DAILY  
  
 nitroglycerin 0.4 mg SL tablet Commonly known as:  NITROSTAT  
1 Tab by SubLINGual route every five (5) minutes as needed for Chest Pain for up to 3 doses. oxyCODONE IR 5 mg immediate release tablet Commonly known as:  Solomon Salen Take 5 mg by mouth every four (4) hours as needed for Pain.  
  
 pantoprazole 40 mg tablet Commonly known as:  PROTONIX  
40 mg.  
  
 penicillin v potassium 500 mg tablet Commonly known as:  VEETID PERCOCET 5-325 mg per tablet Generic drug:  oxyCODONE-acetaminophen Take 1 Tab by Mouth Every 4 Hours As Needed for Pain. Do not exceed 4000 mg of acetaminophen per day. rosuvastatin 5 mg tablet Commonly known as:  CRESTOR Take 1 Tab by mouth nightly. SORAfenib 200 mg tablet Commonly known as:  Marella Gift  
 Take  by mouth two (2) times a day. traMADol 50 mg tablet Commonly known as:  ULTRAM  
Take 1 Tab by mouth every six (6) hours as needed for Pain. Max Daily Amount: 200 mg. Indications: PAIN  
  
 * Notice: This list has 5 medication(s) that are the same as other medications prescribed for you. Read the directions carefully, and ask your doctor or other care provider to review them with you. Prescriptions Printed Refills  
 dronabinol (MARINOL) 5 mg capsule 3 Sig: Take 1 Cap by mouth two (2) times a day. Max Daily Amount: 10 mg.  
 Class: Print Route: Oral  
  
We Performed the Following   
 AFP, TUMOR MARKER [id number] CPT(R)] COMPLETE CBC & AUTO DIFF WBC [01884 CPT(R)] FERRITIN [91659 CPT(R)] IRON PROFILE [id number] CPT(R)] METABOLIC PANEL, COMPREHENSIVE [36608 CPT(R)] Follow-up Instructions Return in about 1 month (around 3/27/2017). To-Do List   
 02/27/2017 Lab:  AFP, TUMOR MARKER   
  
 02/27/2017 Lab:  CBC WITH 3 PART DIFF   
  
 02/27/2017 Lab:  FERRITIN   
  
 02/27/2017 Lab:  IRON PROFILE   
  
 02/27/2017 Lab:  METABOLIC PANEL, COMPREHENSIVE   
  
 03/13/2017 6:00 AM  
  Appointment with [address] at [facility]  [place] [facility] ([phone]) MEDICATIONS  - Patient must bring a complete list of all medications currently taking to include prescriptions, over-the-counter meds, herbals, vitamins & any dietary supplements 03/13/2017 10:00 AM  
  Appointment with [facility] CATH HOLDING; HR RAD NO ANESTHESIA; [facility] ANGIO RADIOLOGIST; [facility] IR RM 1 at [facility] RAD [address] IR ([phone]) DIET INSTRUCTIONS - NPO - Nothing to eat or drink after midnight before the day of your exam.  You may take small sips of water with your medications unless instructed otherwise. GENERAL INSTRUCTIONS - You must have someone to drive you home after the procedure, unless instructed otherwise.   MEDICATIONS - Bring a complete list of all your medications including prescriptions, over-the-counter meds, herbals, vitamins & dietary supplements. QUESTIONS Notify the Angio department in which your are scheduled. -[facility]/[address] [place] Ariane Mick Jose Marie [phone] Introducing [facility] & HE[facility]! [facility] introduces Repeatit patient portal. Now you can access parts of your medical record, email your doctor's office, and request medication refills online. 1. In your internet browser, go to [email or web address] 2. Click on the First Time User? Click Here link in the Sign In box. You will see the New Member Sign Up page. 3. Enter your Repeatit Access Code exactly as it appears below. You will not need to use this code after youve completed the sign-up process. If you do not sign up before the expiration date, you must request a new code. · Repeatit Access Code: [email or web address] Expires: 5/21/2017  9:56 AM 
 
4. Enter the last four digits of your Social Security Number (xxxx) and Date of Birth (mm/dd/yyyy) as indicated and click Submit. You will be taken to the next sign-up page. 5. Create a Repeatit ID. This will be your Repeatit login ID and cannot be changed, so think of one that is secure and easy to remember. 6. Create a Repeatit password. You can change your password at any time. 7. Enter your Password Reset Question and Answer. This can be used at a later time if you forget your password. 8. Enter your e-mail address. You will receive e-mail notification when new information is available in [address] 9. Click Sign Up. You can now view and download portions of your medical record. 10. Click the Download Summary menu link to download a portable copy of your medical information. If you have questions, please visit the Frequently Asked Questions section of the Repeatit website. Remember, Repeatit is NOT to be used for urgent needs. For medical emergencies, dial 911. Now available from your iPhone and Android! Please provide this summary of care documentation to your next provider. Your primary care clinician is listed as Ayo Fernandez. If you have any questions after today's visit, please call [phone].

## 2017-02-27 NOTE — PROGRESS NOTES
Hematology/Oncology  Progress Note    Name: Kenia Crowe  Date: 2017  : 1955    PCP: Lukas Green MD     Mr. Roque Muro is a 59 y.o.  male who was seen for management of his progressive hepatocellular carcinoma. Current therapy: Supportive care and radiofrequency ablation      Subjective:      Mr. Yris Forte is a 10-year-old -American man who has slowly progressive advanced hepatocellular carcinoma. He has previously been treated with Nexavar. This medicine was discontinued due to the progressive decline in his cardiac function. I recently referred him to the interventional radiologist to see if he would be a candidate for radiofrequency ablation. He has had one treatment. The patient has significant change in his performance status and was told that this precludes any additional attempts at this treatment modality. The patient has been offered palliative management in the setting of the hospice program but he states that he is still hoping to find a cure. He is in the process of trying to get a consultative evaluation in [place] at [address]. Today he is complaining of progressive pain. He had been using 150 µg of Duragesic. The patient reports that he has about a 1 week supply of his pain medication remaining. Also states that he has a very poor appetite and is requesting medication to enhance his appetite. Past medical history, family history, and social history: these were reviewed and remains unchanged.     Past Medical History:   Diagnosis Date    Acid reflux     Angina effort ([facility] [address] 10/20/2015    rare, sply when misses meds and BP high     Cancer, hepatocellular ([facility] [address] 3/6/2015    Coronary artery disease     Coronary atherosclerosis of native coronary artery     RCA PCI, NOT ON STATIN DUE TO HEP C AND ELEVATED LFT    Essential hypertension     Essential hypertension, benign     ELEVATED/PT HAS NOT TAKEN MEDS TODAY    Other and unspecified angina pectoris ([facility] [address]     STABLE NOW, FEELS BETTER    Postsurgical percutaneous transluminal coronary angioplasty status     POST RCA JAMES/12/2011, D/C EFFIENT    Type II or unspecified type diabetes mellitus without mention of complication, not stated as uncontrolled      Past Surgical History:   Procedure Laterality Date    HX CORONARY STENT PLACEMENT  2006    RCA STENT, FOR SINGLE VESSEL CAD    HX CORONARY STENT PLACEMENT  2011    RCA: JAMES    HX HEART CATHETERIZATION      HX TONSILLECTOMY       Social History     Social History    Marital status: SINGLE     Spouse name: N/A    Number of children: N/A    Years of education: N/A     Occupational History    Not on file. Social History Main Topics    Smoking status: Former Smoker     Quit date: 4/23/2006    Smokeless tobacco: Never Used      Comment: REMOTELY QUIT TOBACCO USE    Alcohol use No      Comment: REMOTELY QUIT ALCHOL USE    Drug use: No    Sexual activity: Not on file     Other Topics Concern    Not on file     Social History Narrative     Family History   Problem Relation Age of Onset    Heart Disease Father      MI    Heart Attack Father 79    Hypertension Other     Diabetes Other     Stroke Neg Hx      Current Outpatient Prescriptions   Medication Sig Dispense Refill    fentaNYL (DURAGESIC) 100 mcg/hr PATCH 1 Patch by TransDERmal route every seventy-two (72) hours. Max Daily Amount: 1 Patch. 10 Patch 0    fentaNYL (DURAGESIC) 50 mcg/hr PATCH 1 Patch by TransDERmal route every seventy-two (72) hours. Max Daily Amount: 1 Patch. Use 1- 100 µg patch in combination with 1-50 µg patch for a total dose of 150 µg every 72 hours 10 Patch 0    HYDROmorphone (DILAUDID) 4 mg tablet Take 1 Tab by mouth every four (4) hours as needed for Pain. Max Daily Amount: 24 mg. 180 Tab 0    cyanocobalamin (VITAMIN B12) 1,000 mcg/mL injection 1,000 mcg by IntraMUSCular route once.       nitroglycerin (NITROSTAT) 0.4 mg SL tablet 1 Tab by SubLINGual route every five (5) minutes as needed for Chest Pain for up to 3 doses. 25 Tab 0    traMADol (ULTRAM) 50 mg tablet Take 1 Tab by mouth every six (6) hours as needed for Pain. Max Daily Amount: 200 mg. Indications: PAIN 120 Tab 0    isosorbide mononitrate ER (IMDUR) 60 mg CR tablet Take 1 Tab by mouth every morning. 30 Tab 3    hydrALAZINE (APRESOLINE) 50 mg tablet Take 1 Tab by mouth two (2) times a day. Hold if SBP<120 60 Tab 3    metoprolol tartrate (LOPRESSOR) 100 mg IR tablet Take 1 Tab by mouth two (2) times a day. 60 Tab 3    clopidogrel (PLAVIX) 75 mg tablet TAKE 1 TABLET (75 MG) BY ORAL ROUTE ONCE DAILY 30 Tab 11    b complex-vitamin c-folic acid (NEPHROCAPS) 1 mg capsule Take 1 Cap by Mouth Once a Day.  oxyCODONE-acetaminophen (PERCOCET) 5-325 mg per tablet Take 1 Tab by Mouth Every 4 Hours As Needed for Pain. Do not exceed 4000 mg of acetaminophen per day.  ezetimibe (ZETIA) 10 mg tablet 10 mg.      hydroCHLOROthiazide (HYDRODIURIL) 50 mg tablet 50 mg.  pantoprazole (PROTONIX) 40 mg tablet 40 mg.  penicillin v potassium (VEETID) 500 mg tablet   0    fentaNYL (DURAGESIC) 25 mcg/hr PATCH 1 Patch by TransDERmal route every seventy-two (72) hours. Max Daily Amount: 1 Patch. 10 Patch 0    HYDROmorphone (DILAUDID) 2 mg tablet Take 1 Tab by mouth every four (4) hours as needed for Pain. Max Daily Amount: 12 mg. 180 Tab 0    cephALEXin (KEFLEX) 250 mg capsule Take 500 mg by mouth four (4) times daily.  oxyCODONE IR (ROXICODONE) 5 mg immediate release tablet Take 5 mg by mouth every four (4) hours as needed for Pain.  cloNIDine HCl (CATAPRES) 0.1 mg tablet Take 1 Tab by mouth nightly. Hold if SBP<120 (Patient taking differently: Take 0.1 mg by mouth two (2) times a day. Hold if SBP<120) 30 Tab 1    aspirin delayed-release 81 mg tablet Take  by mouth daily. 2 tabs daily      rosuvastatin (CRESTOR) 5 mg tablet Take 1 Tab by mouth nightly.  30 Tab 6    gabapentin (NEURONTIN) 300 mg capsule Take 300 mg by mouth two (2) times a day.  NIFEDICAL XL 60 mg ER tablet TAKE 1 TABLET (60 MG) BY ORAL ROUTE ONCE DAILY 30 Tab 3    SORAfenib (NEXAVAR) 200 mg tablet Take  by mouth two (2) times a day.  losartan (COZAAR) 100 mg tablet Take 1 Tab by mouth daily. 30 Tab 6       Review of Systems  Constitutional: The patient has complaints of some fatigue and weakness due to the cancer associated pain. HEENT: The patient denies recent head trauma, eye pain, blurred vision,  hearing deficit, oropharyngeal mucosal pain or lesions, and the patient denies throat pain or discomfort. Lymphatics: The patient denies palpable peripheral lymphadenopathy. Hematologic: The patient denies having bruising, bleeding, or progressive fatigue. Respiratory: Patient denies having shortness of breath, cough, sputum production, fever, or dyspnea on exertion. Cardiovascular: The patient denies having leg pain, leg swelling, heart palpitations, chest permit, chest pain, or lightheadedness. The patient denies having dyspnea on exertion. Gastrointestinal: The patient denies having nausea, emesis, or diarrhea. The patient denies having any hematemesis or blood in the stool. He is experiencing slowly progressive abdominal pain due to the progressive nature of his advanced hepatocellular carcinoma. Genitourinary: Patient denies having urinary urgency, frequency, or dysuria. The patient denies having blood in the urine. Psychological: The patient denies having symptoms of nervousness, anxiety, depression, or thoughts of harming self. Skin: Patient denies having skin rashes, skin, ulcerations, or unexplained itching or pruritus. Musculoskeletal: The patient denies having pain in the joints or bones. Objective:     Visit Vitals    BP (!) 155/95    Pulse 84    Temp 97.6 °F (36.4 °C)    Ht 5' 11\" (1.803 m)    Wt 85.3 kg (188 lb)    BMI 26.22 kg/m2     ECOG PS=1, Pain score=4/10  Physical Exam:   Gen. Appearance: The patient is in no acute distress. Skin: There is no bruise or rash. HEENT: The exam is unremarkable. Neck: Supple without lymphadenopathy or thyromegaly. Lungs: Clear to auscultation and percussion; there are no wheezes or rhonchi. Heart: Regular rate and rhythm; there are no murmurs, gallops, or rubs. Abdomen: Bowel sounds are present and normal.  There is no guarding, tenderness, or hepatosplenomegaly. Extremities: There is no clubbing, cyanosis, or edema. Neurologic: There are no focal neurologic deficits. Lymphatics: There is no palpable peripheral lymphadenopathy. Musculoskeletal: The patient has full range of motion at all joints. There is no evidence of joint deformity or effusions. There is no focal joint tenderness. Psychological/psychiatric: There is no clinical evidence of anxiety, depression, or melancholy. Lab data:      Results for orders placed or performed during the hospital encounter of 02/27/17   CBC WITH 3 PART DIFF     Status: Abnormal   Result Value Ref Range Status    WBC 4.1 (L) 4.5 - 13.0 K/uL Final    RBC 3.69 (L) 4.10 - 5.10 M/uL Final    HGB 9.0 (L) 12.0 - 16 g/dL Final    HCT 29.8 (L) 36 - 48 % Final    MCV 80.8 78 - 102 FL Final    MCH 24.4 (L) 25.0 - 35.0 PG Final    MCHC 30.2 (L) 31 - 37 g/dL Final    RDW 16.6 (H) 11.5 - 14.5 % Final    PLATELET 100 792 - 454 K/uL Final    NEUTROPHILS 76 (H) 40 - 70 % Final    MIXED CELLS 9 0.1 - 17 % Final    LYMPHOCYTES 15 14 - 44 % Final    ABS. NEUTROPHILS 3.1 1.8 - 9.5 K/UL Final    ABS. MIXED CELLS 0.4 0.0 - 2.3 K/uL Final    ABS. LYMPHOCYTES 0.6 (L) 1.1 - 5.9 K/UL Final     Comment: Test performed at [place] [address] Location. Results Reviewed by Medical Director. DF AUTOMATED   Final           Assessment:     1. Primary malignant neoplasm of liver with metastasis from liver to other site [facility])    2. Cancer, hepatocellular ([facility] [address]    3. Cancer associated pain    4.  Loss of appetite      Plan:   Advanced hepatocellular carcinoma/primary malignant neoplasm of the liver with metastasis from the liver to other sites: The patient was recently hospitalized due to progression of his metastatic hepatocellular carcinoma. He was offered the option of hospice but declined. He is in the process of trying to get a consultative evaluation with Marcus Espinosa. I have explained to the patient that hospice would be his best option of management at this time. He wishes to think over this option further before committing to this management strategy. Cancer associated pain: I have explained to the patient that I will provide him with 150 µg every 72 hours of Duragesic in about 1 week after his prescription is due for refill. .  Dilaudid 4 mg tablets will be provided at that time as well with with instruction to take 1 tablet every 4 hours when necessary as needed as well. Chronic anemia: Have explained to the patient that his CBC today shows that his hemoglobin is 9 g/dL with hematocrit of 29.8%. I will check his iron profile and ferritin levels at this time. Pending the results of this test the patient may need to begin intravenous iron therapy. Loss of appetite: I have explained to the patient that he should begin using nutritional supplements between meals such as boost or Ensure. Additionally, I have provided him with a prescription for Marinol 5 mg p.o. twice daily as an option to enhance his appetite and boost weight gain. Follow-up will occur in 4weeks. Orders Placed This Encounter    COMPLETE CBC & AUTO DIFF WBC    InHouse CBC ([facility])     Standing Status:   Future     Number of Occurrences:   1     Standing Expiration Date:   3/6/2017       Glendon Hammans, MD  2/27/2017      Please note: This document has been produced using voice recognition software. Unrecognized errors in transcription may be present.

## 2017-02-28 LAB
AFP-TM SERPL-MCNC: 35.8 NG/ML (ref 0–8.3)
ALBUMIN SERPL-MCNC: 3.9 G/DL (ref 3.6–4.8)
ALBUMIN/GLOB SERPL: 1.1 {RATIO} (ref 1.1–2.5)
ALP SERPL-CCNC: 61 IU/L (ref 39–117)
ALT SERPL-CCNC: 20 IU/L (ref 0–44)
AST SERPL-CCNC: 65 IU/L (ref 0–40)
BILIRUB SERPL-MCNC: 0.2 MG/DL (ref 0–1.2)
BUN SERPL-MCNC: 12 MG/DL (ref 8–27)
BUN/CREAT SERPL: 17 (ref 10–22)
CALCIUM SERPL-MCNC: 9.1 MG/DL (ref 8.6–10.2)
CHLORIDE SERPL-SCNC: 97 MMOL/L (ref 96–106)
CO2 SERPL-SCNC: 23 MMOL/L (ref 18–29)
CREAT SERPL-MCNC: 0.71 MG/DL (ref 0.76–1.27)
FERRITIN SERPL-MCNC: 99 NG/ML (ref 30–400)
GLOBULIN SER CALC-MCNC: 3.7 G/DL (ref 1.5–4.5)
GLUCOSE SERPL-MCNC: 90 MG/DL (ref 65–99)
IRON SATN MFR SERPL: 14 % (ref 15–55)
IRON SERPL-MCNC: 40 UG/DL (ref 38–169)
POTASSIUM SERPL-SCNC: 4.2 MMOL/L (ref 3.5–5.2)
PROT SERPL-MCNC: 7.6 G/DL (ref 6–8.5)
SODIUM SERPL-SCNC: 137 MMOL/L (ref 134–144)
TIBC SERPL-MCNC: 283 UG/DL (ref 250–450)
UIBC SERPL-MCNC: 243 UG/DL (ref 111–343)

## 2017-03-06 RX ORDER — HYDROMORPHONE HYDROCHLORIDE 4 MG/1
4 TABLET ORAL
Qty: 180 TAB | Refills: 0 | Status: SHIPPED | OUTPATIENT
Start: 2017-03-06 | End: 2017-04-06 | Stop reason: SDUPTHER

## 2017-03-06 RX ORDER — FENTANYL 100 UG/H
1 PATCH TRANSDERMAL
Qty: 10 PATCH | Refills: 0 | Status: SHIPPED | OUTPATIENT
Start: 2017-03-06 | End: 2017-04-06 | Stop reason: SDUPTHER

## 2017-03-06 RX ORDER — MEGESTROL ACETATE 40 MG/ML
800 SUSPENSION ORAL DAILY
Qty: 480 ML | Refills: 12 | Status: SHIPPED | OUTPATIENT
Start: 2017-03-06

## 2017-03-06 RX ORDER — FENTANYL 50 UG/1
1 PATCH TRANSDERMAL
Qty: 10 PATCH | Refills: 0 | Status: SHIPPED | OUTPATIENT
Start: 2017-03-06 | End: 2017-04-11 | Stop reason: SDUPTHER

## 2017-03-27 ENCOUNTER — HOSPITAL ENCOUNTER (OUTPATIENT)
Dept: ONCOLOGY | Age: 62
Discharge: HOME | End: 2017-03-27

## 2017-03-27 ENCOUNTER — OFFICE VISIT (OUTPATIENT)
Dept: ONCOLOGY | Age: 62
End: 2017-03-27

## 2017-03-27 VITALS
HEIGHT: 71 IN | SYSTOLIC BLOOD PRESSURE: 159 MMHG | WEIGHT: 178 LBS | DIASTOLIC BLOOD PRESSURE: 91 MMHG | TEMPERATURE: 98.4 F | HEART RATE: 87 BPM | BODY MASS INDEX: 24.92 KG/M2

## 2017-03-27 DIAGNOSIS — G89.3 CANCER ASSOCIATED PAIN: ICD-10-CM

## 2017-03-27 DIAGNOSIS — D64.9 CHRONIC ANEMIA: ICD-10-CM

## 2017-03-27 DIAGNOSIS — R63.0 LOSS OF APPETITE: ICD-10-CM

## 2017-03-27 DIAGNOSIS — C22.8 PRIMARY MALIGNANT NEOPLASM OF LIVER WITH METASTASIS FROM LIVER TO OTHER SITE (HCC): ICD-10-CM

## 2017-03-27 DIAGNOSIS — C22.8 PRIMARY MALIGNANT NEOPLASM OF LIVER WITH METASTASIS FROM LIVER TO OTHER SITE (HCC): Primary | ICD-10-CM

## 2017-03-27 LAB
BASO+EOS+MONOS # BLD AUTO: 0.5 K/UL (ref 0–2.3)
BASO+EOS+MONOS # BLD AUTO: 11 % (ref 0.1–17)
DIFFERENTIAL METHOD BLD: ABNORMAL
ERYTHROCYTE [DISTWIDTH] IN BLOOD BY AUTOMATED COUNT: 17 % (ref 11.5–14.5)
HCT VFR BLD AUTO: 32.4 % (ref 36–48)
HGB BLD-MCNC: 9.9 G/DL (ref 12–16)
LYMPHOCYTES # BLD AUTO: 16 % (ref 14–44)
LYMPHOCYTES # BLD: 0.8 K/UL (ref 1.1–5.9)
MCH RBC QN AUTO: 24.4 PG (ref 25–35)
MCHC RBC AUTO-ENTMCNC: 30.6 G/DL (ref 31–37)
MCV RBC AUTO: 80 FL (ref 78–102)
NEUTS SEG # BLD: 3.4 K/UL (ref 1.8–9.5)
NEUTS SEG NFR BLD AUTO: 73 % (ref 40–70)
PLATELET # BLD AUTO: 167 K/UL (ref 140–440)
RBC # BLD AUTO: 4.05 M/UL (ref 4.1–5.1)
WBC # BLD AUTO: 4.7 K/UL (ref 4.5–13)

## 2017-03-27 NOTE — PROGRESS NOTES
Hematology/Oncology  Progress Note    Name: Denise Esqueda  Date: 3/27/2017  : 1955    PCP: Tani Terry MD     Mr. Ainsley Ansari is a 59 y.o.  male who was seen for management of his progressive hepatocellular carcinoma. Current therapy: Supportive care and radiofrequency ablation      Subjective:      Mr. Delio Maddox is a 22-year-old -American man who has slowly progressive advanced hepatocellular carcinoma. He has previously been treated with Nexavar. This medicine was discontinued due to the progressive decline in his cardiac function. He was recently to the interventional radiologist to see if he would be a candidate for radiofrequency ablation. He had a couple of treatment and that has been discontinued due to the patient being too weak for treatment. The patient has significant change in his performance status and was told that this precludes any additional attempts at this treatment modality. The patient has been offered palliative management in the setting of the hospice program but he states that he is still hoping to find a cure. He is in the process of trying to get a consultative evaluation in [address] [place] [address] at [address]. Today he is complaining of progressive pain. He had been using 150 µg of Duragesic. The patient reports that he does not need pain medication at this time. Past medical history, family history, and social history: these were reviewed and remains unchanged.     Past Medical History:   Diagnosis Date    Acid reflux     Angina effort ([address] 10/20/2015    rare, sply when misses meds and BP high     Cancer, hepatocellular ([address] 3/6/2015    Coronary artery disease     Coronary atherosclerosis of native coronary artery     RCA PCI, NOT ON STATIN DUE TO HEP C AND ELEVATED LFT    Essential hypertension     Essential hypertension, benign     ELEVATED/PT HAS NOT TAKEN MEDS TODAY    Other and unspecified angina pectoris ([address]     STABLE NOW, FEELS BETTER    Postsurgical percutaneous transluminal coronary angioplasty status     POST RCA JAMES/12/2011, D/C EFFIENT    Type II or unspecified type diabetes mellitus without mention of complication, not stated as uncontrolled      Past Surgical History:   Procedure Laterality Date    HX CORONARY STENT PLACEMENT  2006    RCA STENT, FOR SINGLE VESSEL CAD    HX CORONARY STENT PLACEMENT  2011    RCA: JAMES    HX HEART CATHETERIZATION      HX TONSILLECTOMY       Social History     Social History    Marital status: SINGLE     Spouse name: N/A    Number of children: N/A    Years of education: N/A     Occupational History    Not on file. Social History Main Topics    Smoking status: Former Smoker     Quit date: 4/23/2006    Smokeless tobacco: Never Used      Comment: REMOTELY QUIT TOBACCO USE    Alcohol use No      Comment: REMOTELY QUIT ALCHOL USE    Drug use: No    Sexual activity: Not on file     Other Topics Concern    Not on file     Social History Narrative     Family History   Problem Relation Age of Onset    Heart Disease Father      MI    Heart Attack Father 79    Hypertension Other     Diabetes Other     Stroke Neg Hx      Current Outpatient Prescriptions   Medication Sig Dispense Refill    fentaNYL (DURAGESIC) 100 mcg/hr PATCH 1 Patch by TransDERmal route every seventy-two (72) hours. Max Daily Amount: 1 Patch. 10 Patch 0    fentaNYL (DURAGESIC) 50 mcg/hr PATCH 1 Patch by TransDERmal route every seventy-two (72) hours. Max Daily Amount: 1 Patch. Use 1- 100 µg patch in combination with 1-50 µg patch for a total dose of 150 µg every 72 hours 10 Patch 0    HYDROmorphone (DILAUDID) 4 mg tablet Take 1 Tab by mouth every four (4) hours as needed for Pain. Max Daily Amount: 24 mg. 180 Tab 0    megestrol (MEGACE) 400 mg/10 mL (40 mg/mL) suspension Take 20 mL by mouth daily. 480 mL 12    dronabinol (MARINOL) 5 mg capsule Take 1 Cap by mouth two (2) times a day.  Max Daily Amount: 10 mg. 60 Cap 3    cyanocobalamin (VITAMIN B12) 1,000 mcg/mL injection 1,000 mcg by IntraMUSCular route once.  nitroglycerin (NITROSTAT) 0.4 mg SL tablet 1 Tab by SubLINGual route every five (5) minutes as needed for Chest Pain for up to 3 doses. 25 Tab 0    traMADol (ULTRAM) 50 mg tablet Take 1 Tab by mouth every six (6) hours as needed for Pain. Max Daily Amount: 200 mg. Indications: PAIN 120 Tab 0    isosorbide mononitrate ER (IMDUR) 60 mg CR tablet Take 1 Tab by mouth every morning. 30 Tab 3    hydrALAZINE (APRESOLINE) 50 mg tablet Take 1 Tab by mouth two (2) times a day. Hold if SBP<120 60 Tab 3    metoprolol tartrate (LOPRESSOR) 100 mg IR tablet Take 1 Tab by mouth two (2) times a day. 60 Tab 3    clopidogrel (PLAVIX) 75 mg tablet TAKE 1 TABLET (75 MG) BY ORAL ROUTE ONCE DAILY 30 Tab 11    b complex-vitamin c-folic acid (NEPHROCAPS) 1 mg capsule Take 1 Cap by Mouth Once a Day.  oxyCODONE-acetaminophen (PERCOCET) 5-325 mg per tablet Take 1 Tab by Mouth Every 4 Hours As Needed for Pain. Do not exceed 4000 mg of acetaminophen per day.  ezetimibe (ZETIA) 10 mg tablet 10 mg.      hydroCHLOROthiazide (HYDRODIURIL) 50 mg tablet 50 mg.  pantoprazole (PROTONIX) 40 mg tablet 40 mg.  penicillin v potassium (VEETID) 500 mg tablet   0    fentaNYL (DURAGESIC) 25 mcg/hr PATCH 1 Patch by TransDERmal route every seventy-two (72) hours. Max Daily Amount: 1 Patch. 10 Patch 0    HYDROmorphone (DILAUDID) 2 mg tablet Take 1 Tab by mouth every four (4) hours as needed for Pain. Max Daily Amount: 12 mg. 180 Tab 0    cephALEXin (KEFLEX) 250 mg capsule Take 500 mg by mouth four (4) times daily.  oxyCODONE IR (ROXICODONE) 5 mg immediate release tablet Take 5 mg by mouth every four (4) hours as needed for Pain.  cloNIDine HCl (CATAPRES) 0.1 mg tablet Take 1 Tab by mouth nightly. Hold if SBP<120 (Patient taking differently: Take 0.1 mg by mouth two (2) times a day.  Hold if SBP<120) 30 Tab 1    aspirin delayed-release 81 mg tablet Take  by mouth daily. 2 tabs daily      rosuvastatin (CRESTOR) 5 mg tablet Take 1 Tab by mouth nightly. 30 Tab 6    gabapentin (NEURONTIN) 300 mg capsule Take 300 mg by mouth two (2) times a day.  NIFEDICAL XL 60 mg ER tablet TAKE 1 TABLET (60 MG) BY ORAL ROUTE ONCE DAILY 30 Tab 3    SORAfenib (NEXAVAR) 200 mg tablet Take  by mouth two (2) times a day.  losartan (COZAAR) 100 mg tablet Take 1 Tab by mouth daily. 30 Tab 6       Review of Systems  Constitutional: The patient has complaints of some fatigue and weakness due to the cancer associated pain. HEENT: The patient denies recent head trauma, eye pain, blurred vision,  hearing deficit, oropharyngeal mucosal pain or lesions, and the patient denies throat pain or discomfort. Lymphatics: The patient denies palpable peripheral lymphadenopathy. Hematologic: The patient denies having bruising, bleeding, or progressive fatigue. Respiratory: Patient denies having shortness of breath, cough, sputum production, fever, or dyspnea on exertion. Cardiovascular: The patient denies having leg pain, leg swelling, heart palpitations, chest permit, chest pain, or lightheadedness. The patient denies having dyspnea on exertion. Gastrointestinal: The patient denies having nausea, emesis, or diarrhea. The patient denies having any hematemesis or blood in the stool. He is experiencing slowly progressive abdominal pain due to the progressive nature of his advanced hepatocellular carcinoma. Genitourinary: Patient denies having urinary urgency, frequency, or dysuria. The patient denies having blood in the urine. Psychological: The patient denies having symptoms of nervousness, anxiety, depression, or thoughts of harming self. Skin: Patient denies having skin rashes, skin, ulcerations, or unexplained itching or pruritus. Musculoskeletal: The patient denies having pain in the joints or bones.       Objective:     Visit Vitals    BP (!) 159/91    Pulse 87    Temp 98.4 °F (36.9 °C)    Ht 5' 11\" (1.803 m)    Wt 80.7 kg (178 lb)    BMI 24.83 kg/m2     ECOG PS=1, Pain score=4/10  Physical Exam:   Gen. Appearance: The patient is in no acute distress. Skin: There is no bruise or rash. HEENT: The exam is unremarkable. Neck: Supple without lymphadenopathy or thyromegaly. Lungs: Clear to auscultation and percussion; there are no wheezes or rhonchi. Heart: Regular rate and rhythm; there are no murmurs, gallops, or rubs. Abdomen: Bowel sounds are present and normal.  There is no guarding, tenderness, or hepatosplenomegaly. Extremities: There is no clubbing, cyanosis, or edema. Neurologic: There are no focal neurologic deficits. Lymphatics: There is no palpable peripheral lymphadenopathy. Musculoskeletal: The patient has full range of motion at all joints. There is no evidence of joint deformity or effusions. There is no focal joint tenderness. Psychological/psychiatric: There is no clinical evidence of anxiety, depression, or melancholy. Lab data:      Results for orders placed or performed during the hospital encounter of 03/27/17   CBC WITH 3 PART DIFF     Status: Abnormal   Result Value Ref Range Status    WBC 4.7 4.5 - 13.0 K/uL Final    RBC 4.05 (L) 4.10 - 5.10 M/uL Final    HGB 9.9 (L) 12.0 - 16 g/dL Final    HCT 32.4 (L) 36 - 48 % Final    MCV 80.0 78 - 102 FL Final    MCH 24.4 (L) 25.0 - 35.0 PG Final    MCHC 30.6 (L) 31 - 37 g/dL Final    RDW 17.0 (H) 11.5 - 14.5 % Final    PLATELET 256 405 - 418 K/uL Final    NEUTROPHILS 73 (H) 40 - 70 % Final    MIXED CELLS 11 0.1 - 17 % Final    LYMPHOCYTES 16 14 - 44 % Final    ABS. NEUTROPHILS 3.4 1.8 - 9.5 K/UL Final    ABS. MIXED CELLS 0.5 0.0 - 2.3 K/uL Final    ABS. LYMPHOCYTES 0.8 (L) 1.1 - 5.9 K/UL Final     Comment: Test performed at [place] [address] Location. Results Reviewed by Medical Director. DF AUTOMATED   Final           Assessment:     1.  Primary malignant neoplasm of liver with metastasis from liver to other site [facility])    2. Loss of appetite    3. Chronic anemia    4. Cancer associated pain      Plan:   Advanced hepatocellular carcinoma/primary malignant neoplasm of the liver with metastasis from the liver to other sites:  He was offered the option of hospice but declined. He is in the process of trying to get a consultative evaluation with Marcus Espinosa. Patient have been previously informed during his prior office visit that hospice would be his best option of management at this time. Today patient states he still wishes to think over this option further before committing to this management strategy. Cancer associated pain: Patient will continue to be provided with 150 µg every 72 hours of Duragesic whenever he is due for a refill. Dilaudid 4 mg tablets will be provided as well with with instruction to take 1 tablet every 4 hours when necessary as needed as well. Chronic anemia: Have explained to the patient that his CBC today shows that his hemoglobin is 9.9 g/dL with hematocrit of 32.4%. I will check his iron profile and ferritin levels at this time. Pending the results of this test the patient may need to begin intravenous iron therapy. Loss of appetite: I have explained to the patient that he should begin using nutritional supplements between meals such as boost or Ensure. Additionally,he will continue to use his Marinol 5 mg p.o. twice daily as an option to enhance his appetite and boost weight gain. Follow-up will occur in 4weeks.   Orders Placed This Encounter    COMPLETE CBC & AUTO DIFF WBC    InHouse CBC ([facility])     Standing Status:   Future     Number of Occurrences:   1     Standing Expiration Date:   4/3/2017    IRON PROFILE     Standing Status:   Future     Number of Occurrences:   1     Standing Expiration Date:   3/28/2018    FERRITIN     Standing Status:   Future     Number of Occurrences:   1     Standing Expiration Date:   5/54/5239    METABOLIC PANEL, COMPREHENSIVE     Standing Status:   Future     Number of Occurrences:   1     Standing Expiration Date:   3/28/2018    AFP, TUMOR MARKER     Standing Status:   Future     Number of Occurrences:   1     Standing Expiration Date:   9/27/2017     Order Specific Question:   Patient Race? Answer:   Black     Order Specific Question:   Patient Weight     Answer:   80.7       Luc Vargas NP  3/27/2017    I have assessed the patient independently and  agree with the full assessment as outlined. Yaneth [place] MD, FACP      Please note: This document has been produced using voice recognition software. Unrecognized errors in transcription may be present.

## 2017-03-27 NOTE — MR AVS SNAPSHOT
Visit Information Date & Time Provider Department Dept. Phone Encounter #  
 3/27/2017  2:15 PM Mode Ramirez, [address] [place] [address] Oncology [phone] [id number] Follow-up Instructions Return in about 4 weeks (around 4/24/2017). Your Appointments 4/6/2017 11:15 AM  
ESTABLISHED PATIENT with Mera Altamirano MD  
[facility] ([address]) Appt Note: 3 month follow up/Lipid/LFT  
 [address] [facility]. Critical access hospital [address]  
  
   
 [address] [address] [address] [address]  
  
    
 4/24/2017  2:15 PM  
Office Visit with Mode Ramirez MD  
[address] [place] [address] Oncology [facility]) Appt Note: 1 month follow upappointment [facility] [address], [facility] [address] Critical access hospital [address] [facility], [address] [place] [address] Upcoming Health Maintenance Date Due HEMOGLOBIN A1C Q6M 1955 FOOT EXAM Q1 9/9/1963 MICROALBUMIN Q1 9/9/1963 EYE EXAM RETINAL OR DILATED Q1 9/9/1963 Pneumococcal 19-64 Highest Risk (1 of 3 - PCV13) 9/9/1972 DTaP/Tdap/Td series (1 - Tdap) 9/9/1974 FOBT Q 1 YEAR AGE 50-75 9/9/2003 ZOSTER VACCINE AGE 60> 9/9/2013 LIPID PANEL Q1 4/25/2015 INFLUENZA AGE 9 TO ADULT 8/1/2016 Allergies as of 3/27/2017  Review Complete On: 2/27/2017 By: Mode Ramirez MD  
  
 Severity Noted Reaction Type Reactions Lipitor [Atorvastatin]    Unable to Obtain Other reaction(s): other/intolerance Other reaction(s): other/intolerance Current Immunizations  Never Reviewed No immunizations on file. Not reviewed this visit You Were Diagnosed With   
  
 Codes Comments Primary malignant neoplasm of liver with metastasis from liver to other site [facility])    -  Primary ICD-10-CM: C22.8 ICD-9-CM: 155.0 Vitals BP Pulse Temp Height(growth percentile) Weight(growth percentile) BMI (!) 159/91 87 98.4 °F (36.9 °C) 5' 11\" (1.803 m) 178 lb (80.7 kg) 24.83 kg/m2 Smoking Status Former Smoker Vitals History BMI and BSA Data Body Mass Index Body Surface Area  
 24.83 kg/m 2 2.01 m 2 Preferred Pharmacy Pharmacy Name Phone [facility], [address] Your Updated Medication List  
  
   
This list is accurate as of: 3/27/17  3:31 PM.  Always use your most recent med list.  
  
  
  
  
 aspirin delayed-release 81 mg tablet Take  by mouth daily. 2 tabs daily  
  
 b complex-vitamin c-folic acid 1 mg capsule Commonly known as:  Sienna Kind Take 1 Cap by Mouth Once a Day. cephALEXin 250 mg capsule Commonly known as:  Robertppcatherine Albion Take 500 mg by mouth four (4) times daily. cloNIDine HCl 0.1 mg tablet Commonly known as:  CATAPRES Take 1 Tab by mouth nightly. Hold if SBP<120  
  
 clopidogrel 75 mg Tab Commonly known as:  PLAVIX TAKE 1 TABLET (75 MG) BY ORAL ROUTE ONCE DAILY  
  
 cyanocobalamin 1,000 mcg/mL injection Commonly known as:  VITAMIN B12  
1,000 mcg by IntraMUSCular route once. dronabinol 5 mg capsule Commonly known as:  Shobha Sero Take 1 Cap by mouth two (2) times a day. Max Daily Amount: 10 mg.  
  
 ezetimibe 10 mg tablet Commonly known as:  Marlene Cindy 10 mg.  
  
 * fentaNYL 25 mcg/hr PATCH Commonly known as:  DURAGESIC  
1 Patch by TransDERmal route every seventy-two (72) hours. Max Daily Amount: 1 Patch. * fentaNYL 100 mcg/hr PATCH Commonly known as:  DURAGESIC  
1 Patch by TransDERmal route every seventy-two (72) hours. Max Daily Amount: 1 Patch. * fentaNYL 50 mcg/hr PATCH Commonly known as:  DURAGESIC  
1 Patch by TransDERmal route every seventy-two (72) hours. Max Daily Amount: 1 Patch. Use 1- 100 g patch in combination with 1-50 g patch for a total dose of 150 g every 72 hours  
  
 gabapentin 300 mg capsule Commonly known as:  NEURONTIN Take 300 mg by mouth two (2) times a day. hydrALAZINE 50 mg tablet Commonly known as:  APRESOLINE Take 1 Tab by mouth two (2) times a day. Hold if SBP<120  
  
 hydroCHLOROthiazide 50 mg tablet Commonly known as:  HYDRODIURIL  
50 mg.  
  
 * HYDROmorphone 2 mg tablet Commonly known as:  DILAUDID Take 1 Tab by mouth every four (4) hours as needed for Pain. Max Daily Amount: 12 mg.  
  
 * HYDROmorphone 4 mg tablet Commonly known as:  DILAUDID Take 1 Tab by mouth every four (4) hours as needed for Pain. Max Daily Amount: 24 mg.  
  
 isosorbide mononitrate ER 60 mg CR tablet Commonly known as:  IMDUR Take 1 Tab by mouth every morning. losartan 100 mg tablet Commonly known as:  COZAAR Take 1 Tab by mouth daily. megestrol 400 mg/10 mL (40 mg/mL) suspension Commonly known as:  MEGACE Take 20 mL by mouth daily. metoprolol tartrate 100 mg IR tablet Commonly known as:  LOPRESSOR Take 1 Tab by mouth two (2) times a day. NIFEDICAL XL 60 mg ER tablet Generic drug:  NIFEdipine ER  
TAKE 1 TABLET (60 MG) BY ORAL ROUTE ONCE DAILY  
  
 nitroglycerin 0.4 mg SL tablet Commonly known as:  NITROSTAT  
1 Tab by SubLINGual route every five (5) minutes as needed for Chest Pain for up to 3 doses. oxyCODONE IR 5 mg immediate release tablet Commonly known as:  Gevena Landsberg Take 5 mg by mouth every four (4) hours as needed for Pain.  
  
 pantoprazole 40 mg tablet Commonly known as:  PROTONIX  
40 mg.  
  
 penicillin v potassium 500 mg tablet Commonly known as:  VEETID PERCOCET 5-325 mg per tablet Generic drug:  oxyCODONE-acetaminophen Take 1 Tab by Mouth Every 4 Hours As Needed for Pain. Do not exceed 4000 mg of acetaminophen per day. rosuvastatin 5 mg tablet Commonly known as:  CRESTOR Take 1 Tab by mouth nightly. SORAfenib 200 mg tablet Commonly known as:  Jhonny Crane  
 Take  by mouth two (2) times a day. traMADol 50 mg tablet Commonly known as:  ULTRAM  
Take 1 Tab by mouth every six (6) hours as needed for Pain. Max Daily Amount: 200 mg. Indications: PAIN  
  
 * Notice: This list has 5 medication(s) that are the same as other medications prescribed for you. Read the directions carefully, and ask your doctor or other care provider to review them with you. We Performed the Following COMPLETE CBC & AUTO DIFF WBC [00364 CPT(R)] Follow-up Instructions Return in about 4 weeks (around 4/24/2017). To-Do List   
 03/27/2017 Lab:  CBC WITH 3 PART DIFF Introducing [facility]! Wayne Mcfarlane introduces edjing patient portal. Now you can access parts of your medical record, email your doctor's office, and request medication refills online. 1. In your internet browser, go to [email or web address] 2. Click on the First Time User? Click Here link in the Sign In box. You will see the New Member Sign Up page. 3. Enter your edjing Access Code exactly as it appears below. You will not need to use this code after youve completed the sign-up process. If you do not sign up before the expiration date, you must request a new code. · edjing Access Code: [email or web address] Expires: 5/21/2017 10:56 AM 
 
4. Enter the last four digits of your Social Security Number (xxxx) and Date of Birth (mm/dd/yyyy) as indicated and click Submit. You will be taken to the next sign-up page. 5. Create a Gathert ID. This will be your edjing login ID and cannot be changed, so think of one that is secure and easy to remember. 6. Create a edjing password. You can change your password at any time. 7. Enter your Password Reset Question and Answer. This can be used at a later time if you forget your password. 8. Enter your e-mail address. You will receive e-mail notification when new information is available in [address] 9. Click Sign Up. You can now view and download portions of your medical record. 10. Click the Download Summary menu link to download a portable copy of your medical information. If you have questions, please visit the Frequently Asked Questions section of the [facility] website. Remember, [facility] is NOT to be used for urgent needs. For medical emergencies, dial 911. Now available from your iPhone and Android! Please provide this summary of care documentation to your next provider. Your primary care clinician is listed as Darryl Coffman. If you have any questions after today's visit, please call [phone].

## 2017-03-29 LAB
AFP-TM SERPL-MCNC: 35.8 NG/ML (ref 0–8.3)
ALBUMIN SERPL-MCNC: 4 G/DL (ref 3.6–4.8)
ALBUMIN/GLOB SERPL: 0.9 {RATIO} (ref 1.2–2.2)
ALP SERPL-CCNC: 68 IU/L (ref 39–117)
ALT SERPL-CCNC: 33 IU/L (ref 0–44)
AST SERPL-CCNC: 90 IU/L (ref 0–40)
BILIRUB SERPL-MCNC: 0.4 MG/DL (ref 0–1.2)
BUN SERPL-MCNC: 17 MG/DL (ref 8–27)
BUN/CREAT SERPL: 25 (ref 10–22)
CALCIUM SERPL-MCNC: 9.6 MG/DL (ref 8.6–10.2)
CHLORIDE SERPL-SCNC: 98 MMOL/L (ref 96–106)
CO2 SERPL-SCNC: 22 MMOL/L (ref 18–29)
CREAT SERPL-MCNC: 0.69 MG/DL (ref 0.76–1.27)
FERRITIN SERPL-MCNC: 104 NG/ML (ref 30–400)
GLOBULIN SER CALC-MCNC: 4.4 G/DL (ref 1.5–4.5)
GLUCOSE SERPL-MCNC: 102 MG/DL (ref 65–99)
IRON SATN MFR SERPL: 12 % (ref 15–55)
IRON SERPL-MCNC: 35 UG/DL (ref 38–169)
POTASSIUM SERPL-SCNC: 5.1 MMOL/L (ref 3.5–5.2)
PROT SERPL-MCNC: 8.4 G/DL (ref 6–8.5)
SODIUM SERPL-SCNC: 138 MMOL/L (ref 134–144)
TIBC SERPL-MCNC: 285 UG/DL (ref 250–450)
UIBC SERPL-MCNC: 250 UG/DL (ref 111–343)

## 2017-04-03 DIAGNOSIS — I95.89 OTHER SPECIFIED HYPOTENSION: ICD-10-CM

## 2017-04-03 DIAGNOSIS — I47.1 SVT (SUPRAVENTRICULAR TACHYCARDIA) (HCC): ICD-10-CM

## 2017-04-03 RX ORDER — HYDRALAZINE HYDROCHLORIDE 50 MG/1
50 TABLET, FILM COATED ORAL 2 TIMES DAILY
Qty: 60 TAB | Refills: 6 | Status: SHIPPED | OUTPATIENT
Start: 2017-04-03 | End: 2017-04-03 | Stop reason: SDUPTHER

## 2017-04-03 RX ORDER — CLONIDINE HYDROCHLORIDE 0.1 MG/1
0.1 TABLET ORAL 2 TIMES DAILY
Qty: 60 TAB | Refills: 6 | Status: SHIPPED | OUTPATIENT
Start: 2017-04-03 | End: 2017-04-03 | Stop reason: SDUPTHER

## 2017-04-03 RX ORDER — CLONIDINE HYDROCHLORIDE 0.1 MG/1
0.1 TABLET ORAL 2 TIMES DAILY
Qty: 60 TAB | Refills: 6 | Status: CANCELLED | OUTPATIENT
Start: 2017-04-03

## 2017-04-03 RX ORDER — METOPROLOL TARTRATE 100 MG/1
100 TABLET ORAL 2 TIMES DAILY
Qty: 60 TAB | Refills: 3 | Status: SHIPPED | OUTPATIENT
Start: 2017-04-03 | End: 2017-04-03 | Stop reason: SDUPTHER

## 2017-04-04 RX ORDER — METOPROLOL TARTRATE 100 MG/1
TABLET ORAL
Qty: 60 TAB | Refills: 3 | Status: SHIPPED | OUTPATIENT
Start: 2017-04-04 | End: 2017-07-19 | Stop reason: SDUPTHER

## 2017-04-04 RX ORDER — HYDRALAZINE HYDROCHLORIDE 50 MG/1
TABLET, FILM COATED ORAL
Qty: 60 TAB | Refills: 6 | Status: SHIPPED | OUTPATIENT
Start: 2017-04-04

## 2017-04-04 RX ORDER — CLONIDINE HYDROCHLORIDE 0.1 MG/1
TABLET ORAL
Qty: 60 TAB | Refills: 6 | Status: SHIPPED | OUTPATIENT
Start: 2017-04-04

## 2017-04-06 RX ORDER — NALOXONE HYDROCHLORIDE 4 MG/.1ML
1 SPRAY NASAL ONCE
Qty: 1 BOX | Refills: 0 | Status: SHIPPED | OUTPATIENT
Start: 2017-04-06 | End: 2017-04-06

## 2017-04-06 RX ORDER — OXYCODONE AND ACETAMINOPHEN 5; 325 MG/1; MG/1
TABLET ORAL
Qty: 120 TAB | Refills: 0 | OUTPATIENT
Start: 2017-04-06

## 2017-04-06 RX ORDER — HYDROMORPHONE HYDROCHLORIDE 4 MG/1
4 TABLET ORAL
Qty: 180 TAB | Refills: 0 | OUTPATIENT
Start: 2017-04-06 | End: 2017-04-06 | Stop reason: SDUPTHER

## 2017-04-06 RX ORDER — FENTANYL 100 UG/H
1 PATCH TRANSDERMAL
Qty: 10 PATCH | Refills: 0 | Status: SHIPPED | OUTPATIENT
Start: 2017-04-06 | End: 2017-05-25 | Stop reason: SDUPTHER

## 2017-04-06 RX ORDER — FENTANYL 100 UG/H
1 PATCH TRANSDERMAL
Qty: 10 PATCH | Refills: 0 | OUTPATIENT
Start: 2017-04-06 | End: 2017-04-24 | Stop reason: SDUPTHER

## 2017-04-06 RX ORDER — HYDROMORPHONE HYDROCHLORIDE 4 MG/1
4 TABLET ORAL
Qty: 180 TAB | Refills: 0 | Status: SHIPPED | OUTPATIENT
Start: 2017-04-06 | End: 2017-05-09 | Stop reason: SDUPTHER

## 2017-04-06 RX ORDER — FENTANYL 100 UG/H
1 PATCH TRANSDERMAL
Qty: 10 PATCH | Refills: 0 | OUTPATIENT
Start: 2017-04-06 | End: 2017-04-06 | Stop reason: SDUPTHER

## 2017-04-06 RX ORDER — HYDROMORPHONE HYDROCHLORIDE 4 MG/1
4 TABLET ORAL
Qty: 180 TAB | Refills: 0 | OUTPATIENT
Start: 2017-04-06 | End: 2017-04-06

## 2017-04-06 NOTE — TELEPHONE ENCOUNTER
The patient is on Duragesic and Dilauded for breakthrough. Will need follow up appt if this is not controlling his pain.

## 2017-04-11 RX ORDER — FENTANYL 50 UG/1
1 PATCH TRANSDERMAL
Qty: 10 PATCH | Refills: 0 | Status: SHIPPED | OUTPATIENT
Start: 2017-04-11 | End: 2017-04-24 | Stop reason: SDUPTHER

## 2017-04-11 RX ORDER — NALOXONE HYDROCHLORIDE 4 MG/.1ML
1 SPRAY NASAL ONCE
Qty: 1 BOX | Refills: 0 | Status: SHIPPED | OUTPATIENT
Start: 2017-04-11 | End: 2017-04-11

## 2017-04-11 NOTE — TELEPHONE ENCOUNTER
Mother states that pt had requested Fentanyl 100mg  AND 50mg but only the 100mg was written.   He is requesting an Rx for the 50mg as well and womanishald like to pick it up tomorrow,4-12-17

## 2017-04-24 ENCOUNTER — HOSPITAL ENCOUNTER (OUTPATIENT)
Dept: ONCOLOGY | Age: 62
Discharge: HOME | End: 2017-04-24

## 2017-04-24 ENCOUNTER — OFFICE VISIT (OUTPATIENT)
Dept: ONCOLOGY | Age: 62
End: 2017-04-24

## 2017-04-24 VITALS
WEIGHT: 170 LBS | SYSTOLIC BLOOD PRESSURE: 130 MMHG | BODY MASS INDEX: 23.8 KG/M2 | HEIGHT: 71 IN | HEART RATE: 85 BPM | TEMPERATURE: 97.3 F | DIASTOLIC BLOOD PRESSURE: 94 MMHG

## 2017-04-24 DIAGNOSIS — C22.0 CANCER, HEPATOCELLULAR (HCC): ICD-10-CM

## 2017-04-24 DIAGNOSIS — D64.9 CHRONIC ANEMIA: ICD-10-CM

## 2017-04-24 DIAGNOSIS — C22.8 PRIMARY MALIGNANT NEOPLASM OF LIVER WITH METASTASIS FROM LIVER TO OTHER SITE (HCC): Primary | ICD-10-CM

## 2017-04-24 DIAGNOSIS — G89.3 CANCER ASSOCIATED PAIN: ICD-10-CM

## 2017-04-24 DIAGNOSIS — C22.8 PRIMARY MALIGNANT NEOPLASM OF LIVER WITH METASTASIS FROM LIVER TO OTHER SITE (HCC): ICD-10-CM

## 2017-04-24 DIAGNOSIS — R63.0 LOSS OF APPETITE: ICD-10-CM

## 2017-04-24 LAB
BASO+EOS+MONOS # BLD AUTO: 0.4 K/UL (ref 0–2.3)
BASO+EOS+MONOS # BLD AUTO: 7 % (ref 0.1–17)
DIFFERENTIAL METHOD BLD: ABNORMAL
ERYTHROCYTE [DISTWIDTH] IN BLOOD BY AUTOMATED COUNT: 15.9 % (ref 11.5–14.5)
HCT VFR BLD AUTO: 36.3 % (ref 36–48)
HGB BLD-MCNC: 11.1 G/DL (ref 12–16)
LYMPHOCYTES # BLD AUTO: 17 % (ref 14–44)
LYMPHOCYTES # BLD: 0.9 K/UL (ref 1.1–5.9)
MCH RBC QN AUTO: 24.5 PG (ref 25–35)
MCHC RBC AUTO-ENTMCNC: 30.6 G/DL (ref 31–37)
MCV RBC AUTO: 80.1 FL (ref 78–102)
NEUTS SEG # BLD: 3.7 K/UL (ref 1.8–9.5)
NEUTS SEG NFR BLD AUTO: 76 % (ref 40–70)
PLATELET # BLD AUTO: 187 K/UL (ref 140–440)
RBC # BLD AUTO: 4.53 M/UL (ref 4.1–5.1)
WBC # BLD AUTO: 5 K/UL (ref 4.5–13)

## 2017-04-24 RX ORDER — FENTANYL 100 UG/H
1 PATCH TRANSDERMAL
Qty: 10 PATCH | Refills: 0 | Status: SHIPPED | OUTPATIENT
Start: 2017-04-24

## 2017-04-24 RX ORDER — FENTANYL 50 UG/1
1 PATCH TRANSDERMAL
Qty: 10 PATCH | Refills: 0 | Status: SHIPPED | OUTPATIENT
Start: 2017-04-24 | End: 2017-05-25 | Stop reason: SDUPTHER

## 2017-04-24 NOTE — MR AVS SNAPSHOT
Visit Information Date & Time Provider Department Dept. Phone Encounter #  
 4/24/2017  2:15 PM Arthur Garcia, [address] [place] [address] Oncology [phone] [id number] Follow-up Instructions Return in about 4 weeks (around 5/22/2017). Your Appointments 5/24/2017 11:00 AM  
Office Visit with Arthur Garcia MD  
[address] [place] [address] Oncology [facility]) Appt Note: 1 month follow upappointment Jovana Torres, [address] [facility] [address] [place] [address], [address] [place] [address] Upcoming Health Maintenance Date Due HEMOGLOBIN A1C Q6M 1955 FOOT EXAM Q1 9/9/1963 MICROALBUMIN Q1 9/9/1963 EYE EXAM RETINAL OR DILATED Q1 9/9/1963 Pneumococcal 19-64 Highest Risk (1 of 3 - PCV13) 9/9/1972 DTaP/Tdap/Td series (1 - Tdap) 9/9/1974 FOBT Q 1 YEAR AGE 50-75 9/9/2003 ZOSTER VACCINE AGE 60> 9/9/2013 LIPID PANEL Q1 4/25/2015 INFLUENZA AGE 9 TO ADULT 8/1/2016 Allergies as of 4/24/2017  Review Complete On: 4/24/2017 By: Arthur Garcia MD  
  
 Severity Noted Reaction Type Reactions Lipitor [Atorvastatin]    Unable to Obtain Other reaction(s): other/intolerance Other reaction(s): other/intolerance Current Immunizations  Never Reviewed No immunizations on file. Not reviewed this visit You Were Diagnosed With   
  
 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 3 Chronic anemia     ICD-10-CM: D64.9 ICD-9-CM: 285.9 Loss of appetite     ICD-10-CM: R63.0 ICD-9-CM: 219. 0 Vitals BP Pulse Temp Height(growth percentile) Weight(growth percentile) BMI (!) 130/94 85 97.3 °F (36.3 °C) 5' 11\" (1.803 m) 170 lb (77.1 kg) 23.71 kg/m2 Smoking Status Former Smoker BMI and BSA Data Body Mass Index Body Surface Area  
 23.71 kg/m 2 1.97 m 2 Preferred Pharmacy Pharmacy Name Phone [address] [facility], [address] Your Updated Medication List  
  
   
This list is accurate as of: 4/24/17  3:04 PM.  Always use your most recent med list.  
  
  
  
  
 aspirin delayed-release 81 mg tablet Take  by mouth daily. 2 tabs daily  
  
 b complex-vitamin c-folic acid 1 mg capsule Commonly known as:  Charl Lux Take 1 Cap by Mouth Once a Day. cephALEXin 250 mg capsule Commonly known as:  Myranda Wyldwood Take 500 mg by mouth four (4) times daily. cloNIDine HCl 0.1 mg tablet Commonly known as:  CATAPRES  
take 2 tablets by mouth twice a day  
  
 clopidogrel 75 mg Tab Commonly known as:  PLAVIX TAKE 1 TABLET (75 MG) BY ORAL ROUTE ONCE DAILY  
  
 cyanocobalamin 1,000 mcg/mL injection Commonly known as:  VITAMIN B12  
1,000 mcg by IntraMUSCular route once. dronabinol 5 mg capsule Commonly known as:  Genette Riff Take 1 Cap by mouth two (2) times a day. Max Daily Amount: 10 mg.  
  
 ezetimibe 10 mg tablet Commonly known as:  La Book 10 mg.  
  
 * fentaNYL 25 mcg/hr PATCH Commonly known as:  DURAGESIC  
1 Patch by TransDERmal route every seventy-two (72) hours. Max Daily Amount: 1 Patch. * fentaNYL 100 mcg/hr PATCH Commonly known as:  DURAGESIC  
1 Patch by TransDERmal route every seventy-two (72) hours. Max Daily Amount: 1 Patch. * fentaNYL 50 mcg/hr PATCH Commonly known as:  DURAGESIC  
1 Patch by TransDERmal route every seventy-two (72) hours. Max Daily Amount: 1 Patch. Use 1- 100 g patch in combination with 1-50 g patch for a total dose of 150 g every 72 hours * fentaNYL 100 mcg/hr PATCH Commonly known as:  Matilde Barrientos  
 1 Patch by TransDERmal route every seventy-two (72) hours. Max Daily Amount: 1 Patch.  
  
 gabapentin 300 mg capsule Commonly known as:  NEURONTIN Take 300 mg by mouth two (2) times a day. hydrALAZINE 50 mg tablet Commonly known as:  APRESOLINE  
take 1 tablet by mouth twice a day HOLD DOSE IF SYSTOLIC BLOOD PRESSURE IS LESS THAN 120  
  
 hydroCHLOROthiazide 50 mg tablet Commonly known as:  HYDRODIURIL  
50 mg.  
  
 * HYDROmorphone 2 mg tablet Commonly known as:  DILAUDID Take 1 Tab by mouth every four (4) hours as needed for Pain. Max Daily Amount: 12 mg.  
  
 * HYDROmorphone 4 mg tablet Commonly known as:  DILAUDID Take 1 Tab by mouth every four (4) hours as needed for Pain. Max Daily Amount: 24 mg.  
  
 isosorbide mononitrate ER 60 mg CR tablet Commonly known as:  IMDUR Take 1 Tab by mouth every morning. losartan 100 mg tablet Commonly known as:  COZAAR Take 1 Tab by mouth daily. megestrol 400 mg/10 mL (40 mg/mL) suspension Commonly known as:  MEGACE Take 20 mL by mouth daily. metoprolol tartrate 100 mg IR tablet Commonly known as:  LOPRESSOR  
take 1 tablet by mouth twice a day NIFEDICAL XL 60 mg ER tablet Generic drug:  NIFEdipine ER  
TAKE 1 TABLET (60 MG) BY ORAL ROUTE ONCE DAILY  
  
 nitroglycerin 0.4 mg SL tablet Commonly known as:  NITROSTAT  
1 Tab by SubLINGual route every five (5) minutes as needed for Chest Pain for up to 3 doses. oxyCODONE IR 5 mg immediate release tablet Commonly known as:  Vahe  Take 5 mg by mouth every four (4) hours as needed for Pain.  
  
 pantoprazole 40 mg tablet Commonly known as:  PROTONIX  
40 mg.  
  
 penicillin v potassium 500 mg tablet Commonly known as:  VEETID PERCOCET 5-325 mg per tablet Generic drug:  oxyCODONE-acetaminophen Take 1 Tab by Mouth Every 4 Hours As Needed for Pain. Do not exceed 4000 mg of acetaminophen per day. rosuvastatin 5 mg tablet Commonly known as:  CRESTOR Take 1 Tab by mouth nightly. SORAfenib 200 mg tablet Commonly known as:  [place] Fried Take  by mouth two (2) times a day. traMADol 50 mg tablet Commonly known as:  ULTRAM  
Take 1 Tab by mouth every six (6) hours as needed for Pain. Max Daily Amount: 200 mg. Indications: PAIN  
  
 * Notice: This list has 6 medication(s) that are the same as other medications prescribed for you. Read the directions carefully, and ask your doctor or other care provider to review them with you. Prescriptions Printed Refills  
 fentaNYL (DURAGESIC) 50 mcg/hr PATCH 0 Si Patch by TransDERmal route every seventy-two (72) hours. Max Daily Amount: 1 Patch. Use 1- 100 µg patch in combination with 1-50 µg patch for a total dose of 150 µg every 72 hours Class: Print Route: TransDERmal  
 fentaNYL (DURAGESIC) 100 mcg/hr PATCH 0 Si Patch by TransDERmal route every seventy-two (72) hours. Max Daily Amount: 1 Patch. Class: Print Route: TransDERmal  
  
We Performed the Following COMPLETE CBC & AUTO DIFF WBC [89412 CPT(R)] Follow-up Instructions Return in about 4 weeks (around 2017). To-Do List   
 2017 Lab:  CBC WITH 3 PART DIFF   
  
 2017 Lab:  FERRITIN   
  
 2017 Lab:  IRON PROFILE   
  
 2017 Lab:  METABOLIC PANEL, COMPREHENSIVE Patient Instructions Learning About Cancer Pain What is cancer pain? Cancer pain may be caused by the cancer or by the treatments and tests used. The pain may make it hard for you to do your normal activities, such as sleeping or eating. Over time, cancer pain can cause appetite and sleep problems, isolation, and depression. But most cancer pain can be managed with medicines and other methods. This may not mean that you have no pain but that it stays at a level that you can bear. Treating your pain will make you feel better.  You will be more active, eat and sleep better, and enjoy your family and friends. What are some key points about cancer pain? · You are the only person who can say how much pain you have. If you tell your doctor when you have pain or when pain changes, your doctor can help you. · Cancer pain can almost always be relieved if you work with your doctor to create a treatment plan that is right for you. · Pain is often easier to control right after it starts. This may mean it is better to take regular doses instead of waiting until the pain becomes bad. · Take your medicines exactly as prescribed. Call your doctor if you think you are having a problem with your medicine. · You may find that taking your medicine works most of the time, but your pain flares up during extra activity or for no clear reason. This is called breakthrough pain. Ask your doctor what you can do if this happens. Your doctor can give you a prescription for fast-acting medicines that you can take for breakthrough pain. · People who take cancer pain medicines rarely become addicted to them. Your body may come to expect daily doses of medicine to control the pain. But your doctor can gradually lower the amount you are taking when and if the cause of your pain is gone. What treatments can help you manage cancer pain? Medical treatments to manage cancer pain include: · Prescription and over-the-counter medicines. Many types of medicines are used. Your doctor may suggest different combinations of medicines. · Surgery, chemotherapy, radiation, and hormone therapy. These may be used to remove or destroy a tumor that is causing pain. · Nerve blocks. These are used to help with nerve pain. A medicine is injected into the nerve that affects the painful area. Nonmedical treatments include: · Physical therapy, gentle massage, acupuncture, and heat or cold to ease pain. · Stretching, yoga, and exercises to help you keep your strength, flexibility, and mobility. · Relaxation, biofeedback, or meditation to relieve stress and anxiety. · Short-term crisis therapy with a counselor. This may help you manage your cancer pain or the discomfort from cancer treatments. · Education and emotional support. Learning as much as you can about your pain may help. So can sharing your feelings with others. A support group can be a safe and comfortable place to talk about your illness. · Complementary therapies, such as aromatherapy, prayer, and humor therapy, may be helpful. How can you manage cancer pain? Your doctor needs all the information you can give about what your pain feels like. It often helps to write things down in a pain diary. · Write down when your pain starts, what it feels like, and how long it lasts. Use words like dull, aching, sharp, shooting, throbbing, or burning. · Note changes in your pain. Is it constant, or does it come and go? Do you have more than one kind of pain? How long does it last? 
· Rate your pain on a scale of 0 to 10, with 0 being no pain and 10 being the worst pain you can imagine. · Write exactly where you feel pain. You can use a drawing. Say whether the pain is just in that one place or several places at once. Or tell your doctor if it travels from one place to another. · Write down what makes your pain better or worse. Note when you used a treatment, how well it worked, and any side effects. If you and your doctor are not able to control your pain, ask about seeing a pain specialist. A pain specialist is a health professional who focuses on treating resistant pain. Talk to your doctor if you are having problems with depression. Treating depression can make it easier to manage your cancer pain. Where can you learn more? Go to http://hsahrzad-gume.info/. Enter K531 in the search box to learn more about \"Learning About Cancer Pain. \" Current as of: July 26, 2016 Content Version: 11.2 © 3744-3357 [employer]wise, [employer]. Care instructions adapted under license by [employer] (which disclaims liability or warranty for this information). If you have questions about a medical condition or this instruction, always ask your healthcare professional. [address] any warranty or liability for your use of this information. Anorexia: Care Instructions Your Care Instructions Anorexia is a type of eating disorder. People who have anorexia don't eat enough to stay at a normal weight. Sometimes they also exercise too much. They do these things because they're so afraid of gaining weight. They believe that they're fat, even when they're thin. Often they think that losing even more weight will solve their problems and make their lives better. If you have anorexia, it can really harm your health. This is because your body doesn't get the nutrition it needs. The first step to controlling the problem is admitting that something is wrong. Then counseling can help you change how you think about food, the way you see your body, and any other emotional issues. It may take months or years, but you can recover from anorexia. Follow-up care is a key part of your treatment and safety. Be sure to make and go to all appointments, and call your doctor if you are having problems. It's also a good idea to know your test results and keep a list of the medicines you take. How can you care for yourself at home? Follow your treatment plan · Go to your counseling sessions. Call or talk with your counselor if you can't go or if you think the sessions aren't helping. Don't just stop going. · Take your medicines exactly as prescribed. Call your doctor if you think you are having a problem with your medicine. You will get more details on the specific medicines your doctor prescribes. Trust people who are helping you · Listen to what counselors and nutrition experts say about healthy eating. · Learn about what is included in a balanced diet. Then discuss what you learn. · Let people know how you are feeling. Listen to how they are feeling too. · Accept support and feedback from other people. Learn to be easier on yourself · Learn to focus on your good qualities. · If your body feels weak, slow down and do less. · Remind yourself that feeling bad about yourself is all part of your disorder. · Remember that it takes time to recover from anorexia. · Spend time with other people doing things you enjoy. · Try to focus on one goal at a time. · Don't blame yourself for your disorder. Develop healthy eating habits · With your doctor and counselor, you will decide on a good weight for you. You will also decide how much weight you will gain each week. · Try not to argue with the people you eat with. Arguing increases stress. And stress can make make it harder for you to relax and eat. · Talk with other people during meals about things that interest you. Don't talk about food or gaining weight. Caring for a loved one with anorexia · Remind yourself that anorexia is a long-lasting disorder. It takes time for changes to occur. · Show love and support for your loved one, especially if he or she gets discouraged. · Support your loved one as he or she goes through the steps of recovery. Focus on wellness. Don't focus on food. · Take care of yourself. Continue with your own interests, career, hobbies, and friends. · Don't blame yourself or look for the reason for the disorder. · If you are having a hard time, talk with a counselor. · Learn what to do if your loved one relapses. When should you call for help? Call 911 anytime you think you may need emergency care. For example, call if: · A person with anorexia seems depressed and is talking about suicide.  If the talk about suicide seems real, stay with the person, or ask someone you trust to stay with the person, until you get emergency help. · You have a rapid or irregular heartbeat. · You passed out (lost consciousness). Call your doctor now or seek immediate medical care if: 
· You feel hopeless or have thoughts of hurting yourself. Watch closely for changes in your health, and be sure to contact your doctor if: 
· You have trouble sleeping. · You feel anxious or depressed. Where can you learn more? Go to http://shahrzad-gume.info/. Enter C602 in the search box to learn more about \"Anorexia: Care Instructions. \" Current as of: July 26, 2016 Content Version: 11.2 © 0350-5344 [employer]. Care instructions adapted under license by [employer] (which disclaims liability or warranty for this information). If you have questions about a medical condition or this instruction, always ask your healthcare professional. [address] any warranty or liability for your use of this information. Introducing [facility]! Mary Cohen introduces [employer] patient portal. Now you can access parts of your medical record, email your doctor's office, and request medication refills online. 1. In your internet browser, go to [email or web address] 2. Click on the First Time User? Click Here link in the Sign In box. You will see the New Member Sign Up page. 3. Enter your [employer] Access Code exactly as it appears below. You will not need to use this code after youve completed the sign-up process. If you do not sign up before the expiration date, you must request a new code. · [employer] Access Code: [email or web address] Expires: 5/21/2017 10:56 AM 
 
4. Enter the last four digits of your Social Security Number (xxxx) and Date of Birth (mm/dd/yyyy) as indicated and click Submit. You will be taken to the next sign-up page. 5. Create a [employer] ID.  This will be your [employer] login ID and cannot be changed, so think of one that is secure and easy to remember. 6. Create a [employer]Realm password. You can change your password at any time. 7. Enter your Password Reset Question and Answer. This can be used at a later time if you forget your password. 8. Enter your e-mail address. You will receive e-mail notification when new information is available in [address] 9. Click Sign Up. You can now view and download portions of your medical record. 10. Click the Download Summary menu link to download a portable copy of your medical information. If you have questions, please visit the Frequently Asked Questions section of the [employer] website. Remember, [employer] is NOT to be used for urgent needs. For medical emergencies, dial 911. Now available from your iPhone and Android! Please provide this summary of care documentation to your next provider. Your primary care clinician is listed as Jadyn Baker. If you have any questions after today's visit, please call [phone].

## 2017-04-24 NOTE — PATIENT INSTRUCTIONS
Learning About Cancer Pain  What is cancer pain? Cancer pain may be caused by the cancer or by the treatments and tests used. The pain may make it hard for you to do your normal activities, such as sleeping or eating. Over time, cancer pain can cause appetite and sleep problems, isolation, and depression. But most cancer pain can be managed with medicines and other methods. This may not mean that you have no pain but that it stays at a level that you can bear. Treating your pain will make you feel better. You will be more active, eat and sleep better, and enjoy your family and friends. What are some key points about cancer pain? · You are the only person who can say how much pain you have. If you tell your doctor when you have pain or when pain changes, your doctor can help you. · Cancer pain can almost always be relieved if you work with your doctor to create a treatment plan that is right for you. · Pain is often easier to control right after it starts. This may mean it is better to take regular doses instead of waiting until the pain becomes bad. · Take your medicines exactly as prescribed. Call your doctor if you think you are having a problem with your medicine. · You may find that taking your medicine works most of the time, but your pain flares up during extra activity or for no clear reason. This is called breakthrough pain. Ask your doctor what you can do if this happens. Your doctor can give you a prescription for fast-acting medicines that you can take for breakthrough pain. · People who take cancer pain medicines rarely become addicted to them. Your body may come to expect daily doses of medicine to control the pain. But your doctor can gradually lower the amount you are taking when and if the cause of your pain is gone. What treatments can help you manage cancer pain? Medical treatments to manage cancer pain include:  · Prescription and over-the-counter medicines.  Many types of medicines are used. Your doctor may suggest different combinations of medicines. · Surgery, chemotherapy, radiation, and hormone therapy. These may be used to remove or destroy a tumor that is causing pain. · Nerve blocks. These are used to help with nerve pain. A medicine is injected into the nerve that affects the painful area. Nonmedical treatments include:  · Physical therapy, gentle massage, acupuncture, and heat or cold to ease pain. · Stretching, yoga, and exercises to help you keep your strength, flexibility, and mobility. · Relaxation, biofeedback, or meditation to relieve stress and anxiety. · Short-term crisis therapy with a counselor. This may help you manage your cancer pain or the discomfort from cancer treatments. · Education and emotional support. Learning as much as you can about your pain may help. So can sharing your feelings with others. A support group can be a safe and comfortable place to talk about your illness. · Complementary therapies, such as aromatherapy, prayer, and humor therapy, may be helpful. How can you manage cancer pain? Your doctor needs all the information you can give about what your pain feels like. It often helps to write things down in a pain diary. · Write down when your pain starts, what it feels like, and how long it lasts. Use words like dull, aching, sharp, shooting, throbbing, or burning. · Note changes in your pain. Is it constant, or does it come and go? Do you have more than one kind of pain? How long does it last?  · Rate your pain on a scale of 0 to 10, with 0 being no pain and 10 being the worst pain you can imagine. · Write exactly where you feel pain. You can use a drawing. Say whether the pain is just in that one place or several places at once. Or tell your doctor if it travels from one place to another. · Write down what makes your pain better or worse. Note when you used a treatment, how well it worked, and any side effects.   If you and your doctor are not able to control your pain, ask about seeing a pain specialist. A pain specialist is a health professional who focuses on treating resistant pain. Talk to your doctor if you are having problems with depression. Treating depression can make it easier to manage your cancer pain. Where can you learn more? Go to http://shahrzad-gume.info/. Enter K531 in the search box to learn more about \"Learning About Cancer Pain. \"  Current as of: July 26, 2016  Content Version: 11.2  © 1787-3380 [employer] Care instructions adapted under license by [email or web address] (which disclaims liability or warranty for this information). If you have questions about a medical condition or this instruction, always ask your healthcare professional. [address] any warranty or liability for your use of this information. Anorexia: Care Instructions  Your Care Instructions  Anorexia is a type of eating disorder. People who have anorexia don't eat enough to stay at a normal weight. Sometimes they also exercise too much. They do these things because they're so afraid of gaining weight. They believe that they're fat, even when they're thin. Often they think that losing even more weight will solve their problems and make their lives better. If you have anorexia, it can really harm your health. This is because your body doesn't get the nutrition it needs. The first step to controlling the problem is admitting that something is wrong. Then counseling can help you change how you think about food, the way you see your body, and any other emotional issues. It may take months or years, but you can recover from anorexia. Follow-up care is a key part of your treatment and safety. Be sure to make and go to all appointments, and call your doctor if you are having problems. It's also a good idea to know your test results and keep a list of the medicines you take.   How can you care for yourself at home? Follow your treatment plan  · Go to your counseling sessions. Call or talk with your counselor if you can't go or if you think the sessions aren't helping. Don't just stop going. · Take your medicines exactly as prescribed. Call your doctor if you think you are having a problem with your medicine. You will get more details on the specific medicines your doctor prescribes. Trust people who are helping you  · Listen to what counselors and nutrition experts say about healthy eating. · Learn about what is included in a balanced diet. Then discuss what you learn. · Let people know how you are feeling. Listen to how they are feeling too. · Accept support and feedback from other people. Learn to be easier on yourself  · Learn to focus on your good qualities. · If your body feels weak, slow down and do less. · Remind yourself that feeling bad about yourself is all part of your disorder. · Remember that it takes time to recover from anorexia. · Spend time with other people doing things you enjoy. · Try to focus on one goal at a time. · Don't blame yourself for your disorder. Develop healthy eating habits  · With your doctor and counselor, you will decide on a good weight for you. You will also decide how much weight you will gain each week. · Try not to argue with the people you eat with. Arguing increases stress. And stress can make make it harder for you to relax and eat. · Talk with other people during meals about things that interest you. Don't talk about food or gaining weight. Caring for a loved one with anorexia  · Remind yourself that anorexia is a long-lasting disorder. It takes time for changes to occur. · Show love and support for your loved one, especially if he or she gets discouraged. · Support your loved one as he or she goes through the steps of recovery. Focus on wellness. Don't focus on food. · Take care of yourself.  Continue with your own interests, career, hobbies, and friends. · Don't blame yourself or look for the reason for the disorder. · If you are having a hard time, talk with a counselor. · Learn what to do if your loved one relapses. When should you call for help? Call 911 anytime you think you may need emergency care. For example, call if:  · A person with anorexia seems depressed and is talking about suicide. If the talk about suicide seems real, stay with the person, or ask someone you trust to stay with the person, until you get emergency help. · You have a rapid or irregular heartbeat. · You passed out (lost consciousness). Call your doctor now or seek immediate medical care if:  · You feel hopeless or have thoughts of hurting yourself. Watch closely for changes in your health, and be sure to contact your doctor if:  · You have trouble sleeping. · You feel anxious or depressed. Where can you learn more? Go to http://shahrzad-gume.info/. Enter W089 in the search box to learn more about \"Anorexia: Care Instructions. \"  Current as of: July 26, 2016  Content Version: 11.2  © 8221-0010 [employer]. Care instructions adapted under license by [employer] (which disclaims liability or warranty for this information). If you have questions about a medical condition or this instruction, always ask your healthcare professional. [address] any warranty or liability for your use of this information.

## 2017-04-24 NOTE — PROGRESS NOTES
Hematology/Oncology  Progress Note    Name: Rui Goodman  Date: 3/27/2017  : 1955    PCP: Tita Vaca MD     Mr. Twan Crockett is a 59 y.o.  male who was seen for management of his progressive hepatocellular carcinoma. Current therapy: Supportive care and radiofrequency ablation      Subjective:      Mr. Archie Kim is a 25-year-old -American man who has slowly progressive advanced hepatocellular carcinoma. He has previously been treated with Nexavar. This medicine was discontinued due to the progressive decline in his cardiac function. He was recently to the interventional radiologist to see if he would be a candidate for radiofrequency ablation. He had a couple of treatment and that has been discontinued due to the patient being too weak for treatment. The patient has significant change in his performance status and was told that this precludes any additional attempts at this treatment modality. The patient has been offered palliative management in the setting of the hospice program but he states that he is still hoping to find a cure. He is in the process of trying to get a consultative evaluation in [place] at [address]. Today he is complaining of progressive pain. He had been using 150 µg of Duragesic. Patient reports that he lost the remainder of his Duragesic patches and is requesting a new prescription. Past medical history, family history, and social history: these were reviewed and remains unchanged.     Past Medical History:   Diagnosis Date    Acid reflux     Angina effort ([facility] [address] 10/20/2015    rare, sply when misses meds and BP high     Cancer, hepatocellular ([facility] [address] 3/6/2015    Coronary artery disease     Coronary atherosclerosis of native coronary artery     RCA PCI, NOT ON STATIN DUE TO HEP C AND ELEVATED LFT    Essential hypertension     Essential hypertension, benign     ELEVATED/PT HAS NOT TAKEN MEDS TODAY    Other and unspecified angina pectoris ([facility] [address]     STABLE NOW, FEELS BETTER    Postsurgical percutaneous transluminal coronary angioplasty status     POST RCA JAMES/12/2011, D/C EFFIENT    Type II or unspecified type diabetes mellitus without mention of complication, not stated as uncontrolled      Past Surgical History:   Procedure Laterality Date    HX CORONARY STENT PLACEMENT  2006    RCA STENT, FOR SINGLE VESSEL CAD    HX CORONARY STENT PLACEMENT  2011    RCA: JAMES    HX HEART CATHETERIZATION      HX TONSILLECTOMY       Social History     Social History    Marital status: SINGLE     Spouse name: N/A    Number of children: N/A    Years of education: N/A     Occupational History    Not on file. Social History Main Topics    Smoking status: Former Smoker     Quit date: 4/23/2006    Smokeless tobacco: Never Used      Comment: REMOTELY QUIT TOBACCO USE    Alcohol use No      Comment: REMOTELY QUIT ALCHOL USE    Drug use: No    Sexual activity: Not on file     Other Topics Concern    Not on file     Social History Narrative     Family History   Problem Relation Age of Onset    Heart Disease Father      MI    Heart Attack Father 79    Hypertension Other     Diabetes Other     Stroke Neg Hx      Current Outpatient Prescriptions   Medication Sig Dispense Refill    fentaNYL (DURAGESIC) 50 mcg/hr PATCH 1 Patch by TransDERmal route every seventy-two (72) hours. Max Daily Amount: 1 Patch. Use 1- 100 µg patch in combination with 1-50 µg patch for a total dose of 150 µg every 72 hours 10 Patch 0    fentaNYL (DURAGESIC) 100 mcg/hr PATCH 1 Patch by TransDERmal route every seventy-two (72) hours. Max Daily Amount: 1 Patch. 10 Patch 0    HYDROmorphone (DILAUDID) 4 mg tablet Take 1 Tab by mouth every four (4) hours as needed for Pain. Max Daily Amount: 24 mg. 180 Tab 0    fentaNYL (DURAGESIC) 100 mcg/hr PATCH 1 Patch by TransDERmal route every seventy-two (72) hours. Max Daily Amount: 1 Patch.  10 Patch 0    cloNIDine HCl (CATAPRES) 0.1 mg tablet take 2 tablets by mouth twice a day 60 Tab 6    hydrALAZINE (APRESOLINE) 50 mg tablet take 1 tablet by mouth twice a day HOLD DOSE IF SYSTOLIC BLOOD PRESSURE IS LESS THAN 120 60 Tab 6    metoprolol tartrate (LOPRESSOR) 100 mg IR tablet take 1 tablet by mouth twice a day 60 Tab 3    megestrol (MEGACE) 400 mg/10 mL (40 mg/mL) suspension Take 20 mL by mouth daily. 480 mL 12    dronabinol (MARINOL) 5 mg capsule Take 1 Cap by mouth two (2) times a day. Max Daily Amount: 10 mg. 60 Cap 3    cyanocobalamin (VITAMIN B12) 1,000 mcg/mL injection 1,000 mcg by IntraMUSCular route once.  nitroglycerin (NITROSTAT) 0.4 mg SL tablet 1 Tab by SubLINGual route every five (5) minutes as needed for Chest Pain for up to 3 doses. 25 Tab 0    traMADol (ULTRAM) 50 mg tablet Take 1 Tab by mouth every six (6) hours as needed for Pain. Max Daily Amount: 200 mg. Indications: PAIN 120 Tab 0    isosorbide mononitrate ER (IMDUR) 60 mg CR tablet Take 1 Tab by mouth every morning. 30 Tab 3    clopidogrel (PLAVIX) 75 mg tablet TAKE 1 TABLET (75 MG) BY ORAL ROUTE ONCE DAILY 30 Tab 11    b complex-vitamin c-folic acid (NEPHROCAPS) 1 mg capsule Take 1 Cap by Mouth Once a Day.  oxyCODONE-acetaminophen (PERCOCET) 5-325 mg per tablet Take 1 Tab by Mouth Every 4 Hours As Needed for Pain. Do not exceed 4000 mg of acetaminophen per day.  ezetimibe (ZETIA) 10 mg tablet 10 mg.      hydroCHLOROthiazide (HYDRODIURIL) 50 mg tablet 50 mg.  pantoprazole (PROTONIX) 40 mg tablet 40 mg.  penicillin v potassium (VEETID) 500 mg tablet   0    fentaNYL (DURAGESIC) 25 mcg/hr PATCH 1 Patch by TransDERmal route every seventy-two (72) hours. Max Daily Amount: 1 Patch. 10 Patch 0    HYDROmorphone (DILAUDID) 2 mg tablet Take 1 Tab by mouth every four (4) hours as needed for Pain. Max Daily Amount: 12 mg. 180 Tab 0    cephALEXin (KEFLEX) 250 mg capsule Take 500 mg by mouth four (4) times daily.       oxyCODONE IR (ROXICODONE) 5 mg immediate release tablet Take 5 mg by mouth every four (4) hours as needed for Pain.  aspirin delayed-release 81 mg tablet Take  by mouth daily. 2 tabs daily      rosuvastatin (CRESTOR) 5 mg tablet Take 1 Tab by mouth nightly. 30 Tab 6    gabapentin (NEURONTIN) 300 mg capsule Take 300 mg by mouth two (2) times a day.  NIFEDICAL XL 60 mg ER tablet TAKE 1 TABLET (60 MG) BY ORAL ROUTE ONCE DAILY 30 Tab 3    SORAfenib (NEXAVAR) 200 mg tablet Take  by mouth two (2) times a day.  losartan (COZAAR) 100 mg tablet Take 1 Tab by mouth daily. 30 Tab 6       Review of Systems  Constitutional: The patient has complaints of some fatigue and weakness due to the cancer associated pain. HEENT: The patient denies recent head trauma, eye pain, blurred vision,  hearing deficit, oropharyngeal mucosal pain or lesions, and the patient denies throat pain or discomfort. Lymphatics: The patient denies palpable peripheral lymphadenopathy. Hematologic: The patient denies having bruising, bleeding, or progressive fatigue. Respiratory: Patient denies having shortness of breath, cough, sputum production, fever, or dyspnea on exertion. Cardiovascular: The patient denies having leg pain, leg swelling, heart palpitations, chest permit, chest pain, or lightheadedness. The patient denies having dyspnea on exertion. Gastrointestinal: The patient denies having nausea, emesis, or diarrhea. The patient denies having any hematemesis or blood in the stool. He is experiencing slowly progressive abdominal pain due to the progressive nature of his advanced hepatocellular carcinoma. Genitourinary: Patient denies having urinary urgency, frequency, or dysuria. The patient denies having blood in the urine. Psychological: The patient denies having symptoms of nervousness, anxiety, depression, or thoughts of harming self.   Skin: Patient denies having skin rashes, skin, ulcerations, or unexplained itching or pruritus. Musculoskeletal: The patient denies having pain in the joints or bones. Objective:     Visit Vitals    BP (!) 130/94    Pulse 85    Temp 97.3 °F (36.3 °C)    Ht 5' 11\" (1.803 m)    Wt 77.1 kg (170 lb)    BMI 23.71 kg/m2     ECOG PS=1, Pain score=4/10    Physical Exam:   Gen. Appearance: The patient is in no acute distress. Skin: There is no bruise or rash. HEENT: The exam is unremarkable. Neck: Supple without lymphadenopathy or thyromegaly. Lungs: Clear to auscultation and percussion; there are no wheezes or rhonchi. Heart: Regular rate and rhythm; there are no murmurs, gallops, or rubs. Abdomen: Bowel sounds are present and normal.  There is no guarding, tenderness, or hepatosplenomegaly. Extremities: There is no clubbing, cyanosis, or edema. Neurologic: There are no focal neurologic deficits. Lymphatics: There is no palpable peripheral lymphadenopathy. Musculoskeletal: The patient has full range of motion at all joints. There is no evidence of joint deformity or effusions. There is no focal joint tenderness. Psychological/psychiatric: There is no clinical evidence of anxiety, depression, or melancholy. Lab data:      Results for orders placed or performed during the hospital encounter of 04/24/17   CBC WITH 3 PART DIFF     Status: Abnormal   Result Value Ref Range Status    WBC 5.0 4.5 - 13.0 K/uL Final    RBC 4.53 4.10 - 5.10 M/uL Final    HGB 11.1 (L) 12.0 - 16 g/dL Final    HCT 36.3 36 - 48 % Final    MCV 80.1 78 - 102 FL Final    MCH 24.5 (L) 25.0 - 35.0 PG Final    MCHC 30.6 (L) 31 - 37 g/dL Final    RDW 15.9 (H) 11.5 - 14.5 % Final    PLATELET 583 890 - 974 K/uL Final    NEUTROPHILS 76 (H) 40 - 70 % Final    MIXED CELLS 7 0.1 - 17 % Final    LYMPHOCYTES 17 14 - 44 % Final    ABS. NEUTROPHILS 3.7 1.8 - 9.5 K/UL Final    ABS. MIXED CELLS 0.4 0.0 - 2.3 K/uL Final    ABS.  LYMPHOCYTES 0.9 (L) 1.1 - 5.9 K/UL Final     Comment: Test performed at [facility]. Results Reviewed by Medical Director. DF AUTOMATED   Final           Assessment:     1. Primary malignant neoplasm of liver with metastasis from liver to other site [facility])    2. Cancer, hepatocellular ([facility] [address]    3. Cancer associated pain    4. Chronic anemia    5. Loss of appetite      Plan:   Advanced hepatocellular carcinoma/primary malignant neoplasm of the liver with metastasis from the liver to other sites:  He was offered the option of hospice but declined. He is in the process of trying to get a consultative evaluation with Marcus Espinosa. Patient have been previously informed during his prior office visit that hospice would be his best option of management at this time. Today patient states he still wishes to think over this option further before committing to this management strategy. Cancer associated pain: Patient will continue to be provided with 150 µg every 72 hours of Duragesic whenever he is due for a refill. Dilaudid 4 mg tablets will be provided as well with instruction to take 1 tablet every 4 hours when necessary as needed as well. I have provided the patient with a new prescription for Duragesic 100 µg and 50 µg patches at this time. Chronic anemia: Have explained to the patient that his CBC today shows that his hemoglobin is 11.1 g/dL with hematocrit of 36.3 %. I will check his iron profile and ferritin levels at this time. Pending the results of this test the patient may need to begin intravenous iron therapy. Loss of appetite: I have explained to the patient that he should continue using nutritional supplements between meals such as boost or Ensure. Additionally,he will continue to use his Marinol 5 mg p.o. twice daily as an option to enhance his appetite and boost weight gain. Follow-up will occur in 4weeks.   Orders Placed This Encounter    COMPLETE CBC & AUTO DIFF WBC    InHouse CBC (tolingo)     Standing Status:   Future     Number of Occurrences:   1     Standing Expiration Date:   8706    METABOLIC PANEL, COMPREHENSIVE     Standing Status:   Future     Number of Occurrences:   1     Standing Expiration Date:   2018    IRON PROFILE     Standing Status:   Future     Number of Occurrences:   1     Standing Expiration Date:   2018    FERRITIN     Standing Status:   Future     Number of Occurrences:   1     Standing Expiration Date:   2018    fentaNYL (DURAGESIC) 50 mcg/hr PATCH     Si Patch by TransDERmal route every seventy-two (72) hours. Max Daily Amount: 1 Patch. Use 1- 100 µg patch in combination with 1-50 µg patch for a total dose of 150 µg every 72 hours     Dispense:  10 Patch     Refill:  0    fentaNYL (DURAGESIC) 100 mcg/hr PATCH     Si Patch by TransDERmal route every seventy-two (72) hours. Max Daily Amount: 1 Patch. Dispense:  10 Patch     Refill:  0       Myron Vernon MD  2017      Please note: This document has been produced using voice recognition software. Unrecognized errors in transcription may be present.

## 2017-04-25 LAB
ALBUMIN SERPL-MCNC: 4 G/DL (ref 3.6–4.8)
ALBUMIN/GLOB SERPL: 0.9 {RATIO} (ref 1.2–2.2)
ALP SERPL-CCNC: 76 IU/L (ref 39–117)
ALT SERPL-CCNC: 25 IU/L (ref 0–44)
AST SERPL-CCNC: 50 IU/L (ref 0–40)
BILIRUB SERPL-MCNC: 0.4 MG/DL (ref 0–1.2)
BUN SERPL-MCNC: 15 MG/DL (ref 8–27)
BUN/CREAT SERPL: 21 (ref 10–24)
CALCIUM SERPL-MCNC: 9.6 MG/DL (ref 8.6–10.2)
CHLORIDE SERPL-SCNC: 95 MMOL/L (ref 96–106)
CO2 SERPL-SCNC: 26 MMOL/L (ref 18–29)
CREAT SERPL-MCNC: 0.73 MG/DL (ref 0.76–1.27)
FERRITIN SERPL-MCNC: 119 NG/ML (ref 30–400)
GLOBULIN SER CALC-MCNC: 4.5 G/DL (ref 1.5–4.5)
GLUCOSE SERPL-MCNC: 108 MG/DL (ref 65–99)
IRON SATN MFR SERPL: 18 % (ref 15–55)
IRON SERPL-MCNC: 53 UG/DL (ref 38–169)
POTASSIUM SERPL-SCNC: 4.5 MMOL/L (ref 3.5–5.2)
PROT SERPL-MCNC: 8.5 G/DL (ref 6–8.5)
SODIUM SERPL-SCNC: 137 MMOL/L (ref 134–144)
TIBC SERPL-MCNC: 291 UG/DL (ref 250–450)
UIBC SERPL-MCNC: 238 UG/DL (ref 111–343)

## 2017-05-09 RX ORDER — NALOXONE HYDROCHLORIDE 4 MG/.1ML
1 SPRAY NASAL ONCE
Qty: 1 BOX | Refills: 0 | Status: SHIPPED | OUTPATIENT
Start: 2017-05-09 | End: 2017-05-09

## 2017-05-09 RX ORDER — HYDROMORPHONE HYDROCHLORIDE 4 MG/1
4 TABLET ORAL
Qty: 180 TAB | Refills: 0 | Status: SHIPPED | OUTPATIENT
Start: 2017-05-09 | End: 2017-06-01 | Stop reason: SDUPTHER

## 2017-05-25 RX ORDER — FENTANYL 100 UG/H
1 PATCH TRANSDERMAL
Qty: 10 PATCH | Refills: 0 | Status: SHIPPED | OUTPATIENT
Start: 2017-05-25 | End: 2017-06-01 | Stop reason: SDUPTHER

## 2017-05-25 RX ORDER — NALOXONE HYDROCHLORIDE 4 MG/.1ML
1 SPRAY NASAL ONCE
Qty: 1 BOX | Refills: 0 | Status: SHIPPED | OUTPATIENT
Start: 2017-05-25 | End: 2017-05-25

## 2017-05-25 RX ORDER — FENTANYL 50 UG/1
1 PATCH TRANSDERMAL
Qty: 10 PATCH | Refills: 0 | Status: SHIPPED | OUTPATIENT
Start: 2017-05-25 | End: 2017-06-01 | Stop reason: SDUPTHER

## 2017-06-01 RX ORDER — HYDROMORPHONE HYDROCHLORIDE 4 MG/1
4 TABLET ORAL
Qty: 180 TAB | Refills: 0 | Status: SHIPPED | OUTPATIENT
Start: 2017-06-01 | End: 2017-06-19 | Stop reason: SDUPTHER

## 2017-06-01 RX ORDER — FENTANYL 50 UG/1
1 PATCH TRANSDERMAL
Qty: 10 PATCH | Refills: 0 | Status: SHIPPED | OUTPATIENT
Start: 2017-06-01

## 2017-06-01 RX ORDER — FENTANYL 100 UG/H
1 PATCH TRANSDERMAL
Qty: 10 PATCH | Refills: 0 | Status: SHIPPED | OUTPATIENT
Start: 2017-06-01

## 2017-06-19 ENCOUNTER — OFFICE VISIT (OUTPATIENT)
Dept: ONCOLOGY | Age: 62
End: 2017-06-19

## 2017-06-19 ENCOUNTER — HOSPITAL ENCOUNTER (OUTPATIENT)
Dept: ONCOLOGY | Age: 62
Discharge: HOME | End: 2017-06-19

## 2017-06-19 VITALS
HEART RATE: 88 BPM | SYSTOLIC BLOOD PRESSURE: 148 MMHG | WEIGHT: 170 LBS | BODY MASS INDEX: 23.71 KG/M2 | TEMPERATURE: 98.4 F | DIASTOLIC BLOOD PRESSURE: 78 MMHG

## 2017-06-19 DIAGNOSIS — D64.9 CHRONIC ANEMIA: ICD-10-CM

## 2017-06-19 DIAGNOSIS — C22.0 CANCER, HEPATOCELLULAR (HCC): ICD-10-CM

## 2017-06-19 DIAGNOSIS — C22.8 PRIMARY MALIGNANT NEOPLASM OF LIVER WITH METASTASIS FROM LIVER TO OTHER SITE (HCC): ICD-10-CM

## 2017-06-19 DIAGNOSIS — G89.3 CANCER ASSOCIATED PAIN: ICD-10-CM

## 2017-06-19 DIAGNOSIS — C22.8 PRIMARY MALIGNANT NEOPLASM OF LIVER WITH METASTASIS FROM LIVER TO OTHER SITE (HCC): Primary | ICD-10-CM

## 2017-06-19 DIAGNOSIS — R63.0 LOSS OF APPETITE: ICD-10-CM

## 2017-06-19 LAB
BASO+EOS+MONOS # BLD AUTO: 0.4 K/UL (ref 0–2.3)
BASO+EOS+MONOS # BLD AUTO: 10 % (ref 0.1–17)
DIFFERENTIAL METHOD BLD: ABNORMAL
ERYTHROCYTE [DISTWIDTH] IN BLOOD BY AUTOMATED COUNT: 15.4 % (ref 11.5–14.5)
HCT VFR BLD AUTO: 33.2 % (ref 36–48)
HGB BLD-MCNC: 10 G/DL (ref 12–16)
LYMPHOCYTES # BLD AUTO: 15 % (ref 14–44)
LYMPHOCYTES # BLD: 0.5 K/UL (ref 1.1–5.9)
MCH RBC QN AUTO: 25.1 PG (ref 25–35)
MCHC RBC AUTO-ENTMCNC: 30.1 G/DL (ref 31–37)
MCV RBC AUTO: 83.2 FL (ref 78–102)
NEUTS SEG # BLD: 2.5 K/UL (ref 1.8–9.5)
NEUTS SEG NFR BLD AUTO: 74 % (ref 40–70)
PLATELET # BLD AUTO: 136 K/UL (ref 140–440)
RBC # BLD AUTO: 3.99 M/UL (ref 4.1–5.1)
WBC # BLD AUTO: 3.4 K/UL (ref 4.5–13)

## 2017-06-19 RX ORDER — FENTANYL 75 UG/H
1 PATCH TRANSDERMAL
Qty: 5 PATCH | Refills: 0 | Status: SHIPPED | OUTPATIENT
Start: 2017-06-19

## 2017-06-19 RX ORDER — FENTANYL 100 UG/H
1 PATCH TRANSDERMAL
Qty: 5 PATCH | Refills: 0 | Status: SHIPPED | OUTPATIENT
Start: 2017-06-19

## 2017-06-19 RX ORDER — HYDROMORPHONE HYDROCHLORIDE 4 MG/1
4 TABLET ORAL
Qty: 180 TAB | Refills: 0 | Status: SHIPPED | OUTPATIENT
Start: 2017-06-19

## 2017-06-19 NOTE — PATIENT INSTRUCTIONS
Learning About Cancer Pain  What is cancer pain? Cancer pain may be caused by the cancer or by the treatments and tests used. The pain may make it hard for you to do your normal activities, such as sleeping or eating. Over time, cancer pain can cause appetite and sleep problems, isolation, and depression. But most cancer pain can be managed with medicines and other methods. This may not mean that you have no pain but that it stays at a level that you can bear. Treating your pain will make you feel better. You will be more active, eat and sleep better, and enjoy your family and friends. What are some key points about cancer pain? · You are the only person who can say how much pain you have. If you tell your doctor when you have pain or when pain changes, your doctor can help you. · Cancer pain can almost always be relieved if you work with your doctor to create a treatment plan that is right for you. · Pain is often easier to control right after it starts. This may mean it is better to take regular doses instead of waiting until the pain becomes bad. · Take your medicines exactly as prescribed. Call your doctor if you think you are having a problem with your medicine. · You may find that taking your medicine works most of the time, but your pain flares up during extra activity or for no clear reason. This is called breakthrough pain. Ask your doctor what you can do if this happens. Your doctor can give you a prescription for fast-acting medicines that you can take for breakthrough pain. · People who take cancer pain medicines rarely become addicted to them. Your body may come to expect daily doses of medicine to control the pain. But your doctor can gradually lower the amount you are taking when and if the cause of your pain is gone. What treatments can help you manage cancer pain? Medical treatments to manage cancer pain include:  · Prescription and over-the-counter medicines.  Many types of medicines are used. Your doctor may suggest different combinations of medicines. · Surgery, chemotherapy, radiation, and hormone therapy. These may be used to remove or destroy a tumor that is causing pain. · Nerve blocks. These are used to help with nerve pain. A medicine is injected into the nerve that affects the painful area. Nonmedical treatments include:  · Physical therapy, gentle massage, acupuncture, and heat or cold to ease pain. · Stretching, yoga, and exercises to help you keep your strength, flexibility, and mobility. · Relaxation, biofeedback, or meditation to relieve stress and anxiety. · Short-term crisis therapy with a counselor. This may help you manage your cancer pain or the discomfort from cancer treatments. · Education and emotional support. Learning as much as you can about your pain may help. So can sharing your feelings with others. A support group can be a safe and comfortable place to talk about your illness. · Complementary therapies, such as aromatherapy, prayer, and humor therapy, may be helpful. How can you manage cancer pain? Your doctor needs all the information you can give about what your pain feels like. It often helps to write things down in a pain diary. · Write down when your pain starts, what it feels like, and how long it lasts. Use words like dull, aching, sharp, shooting, throbbing, or burning. · Note changes in your pain. Is it constant, or does it come and go? Do you have more than one kind of pain? How long does it last?  · Rate your pain on a scale of 0 to 10, with 0 being no pain and 10 being the worst pain you can imagine. · Write exactly where you feel pain. You can use a drawing. Say whether the pain is just in that one place or several places at once. Or tell your doctor if it travels from one place to another. · Write down what makes your pain better or worse. Note when you used a treatment, how well it worked, and any side effects.   If you and your doctor are not able to control your pain, ask about seeing a pain specialist. A pain specialist is a health professional who focuses on treating resistant pain. Talk to your doctor if you are having problems with depression. Treating depression can make it easier to manage your cancer pain. Where can you learn more? Go to http://shahrzad-gume.info/. Enter K531 in the search box to learn more about \"Learning About Cancer Pain. \"  Current as of: July 26, 2016  Content Version: 11.3  © 1241-2992 [employer]. Care instructions adapted under license by [employer] (which disclaims liability or warranty for this information). If you have questions about a medical condition or this instruction, always ask your healthcare professional. [address] any warranty or liability for your use of this information. Liver Cancer: Care Instructions  Your Care Instructions    Liver cancer occurs when abnormal cells grow out of control in the liver. Many times the cancer starts at another part of the body, like the colon or lung, and spreads to the liver. Other times, the cancer starts in the liver. Treatment for liver cancer depends on what kind of cancer you have and how far it has spread. You may need surgery, radiation therapy, or chemotherapy. Another treatment uses different methods--radio waves, freezing, or injecting chemicals directly into the tumor--to kill cancer cells. You may need more than one of kind of treatment. If your cancer cannot be cured, the goal may be to remove or destroy as much of the tumor as possible. This can prevent cancer from growing, spreading, or returning for as long as possible. Treatment with chemotherapy or radiation can make you feel very tired and sick to your stomach and may cause diarrhea. It also can make your immune system weaker. This can raise your risk of infection. Finding out that you have cancer is scary.  You may feel many emotions and may need some help coping. Seek out family, friends, and counselors for support. You also can do things at home to make yourself feel better while you go through treatment. Call the [facility] ([phone]) or visit its website at [address] [employer] for more information. Follow-up care is a key part of your treatment and safety. Be sure to make and go to all appointments, and call your doctor if you are having problems. It's also a good idea to know your test results and keep a list of the medicines you take. How can you care for yourself at home? · Take your medicines exactly as prescribed. Call your doctor if you have any problems with your medicine. You may get medicine for nausea and vomiting if you have these side effects. · Eat healthy food. If you do not feel like eating, try to eat food that has protein and extra calories to keep up your strength and prevent weight loss. Drink liquid meal replacements for extra calories and protein. Try to eat your main meal early. Some people do better with small, frequent meals rather than one or two large ones. · Get some physical activity every day, but do not get too tired. Keep doing the hobbies you enjoy as your energy allows. · Take steps to control your stress and workload. Learn relaxation techniques. ¨ Share your feelings. Stress and tension affect our emotions. By expressing your feelings to others, you may be able to understand and cope with them. ¨ Consider joining a support group. Talking about a problem with your spouse, a good friend, or other people with similar problems is a good way to reduce tension and stress. ¨ Express yourself through art. Try writing, crafts, dance, or art to relieve stress. Some dance, writing, or art groups may be available just for people who have cancer. ¨ Be kind to your body and mind.  Getting enough sleep, eating a healthy diet, and taking time to do things you enjoy can contribute to an overall feeling of balance in your life and can help reduce stress. ¨ Get help if you need it. Discuss your concerns with your doctor, counselor, or other health professional.  · If you are vomiting or have diarrhea:  ¨ Drink plenty of fluids (enough so that your urine is light yellow or clear like water) to prevent dehydration. Choose water and other caffeine-free clear liquids. If you have kidney, heart, or liver disease and have to limit fluids, talk with your doctor before you increase the amount of fluids you drink. ¨ When you are able to eat, try clear soups, mild foods, and liquids until all symptoms are gone for 12 to 48 hours. Other good choices include dry toast, crackers, cooked cereal, and gelatin dessert, such as Jell-O.  · If you have not already done so, prepare a list of advance directives. Advance directives are instructions to your doctor and family members about what kind of care you want if you become unable to speak or express yourself. When should you call for help? Call 911 anytime you think you may need emergency care. For example, call if:  · You cough up blood. · You vomit blood or what looks like coffee grounds. · You pass maroon, black, or very bloody stools. Call your doctor now or seek immediate medical care if:  · You have any unusual bleeding, such as:  ¨ Blood spots under the skin. ¨ A nosebleed that you cannot stop. ¨ Bleeding gums when you brush your teeth. ¨ Blood in your urine. ¨ Vaginal bleeding when you are not having your period, or heavy period bleeding. · You have a fever, chills, or body aches. · You are very sleepy or confused. · You have nausea or vomiting that is not controlled by medicine. · Your pain is not controlled by medicine. Watch closely for changes in your health, and be sure to contact your doctor if:  · Your skin or the whites of your eyes turn yellow, or they are more yellow than they were before.   · You are not able to eat well and are losing weight. · You feel more tired than usual.  · You feel very sad or anxious, or both. · Your symptoms do not get better, or they get worse. Where can you learn more? Go to http://shahrzad-gume.info/. Enter P483 in the search box to learn more about \"Liver Cancer: Care Instructions. \"  Current as of: July 26, 2016  Content Version: 11.3  © 1920-0233 [employer]. Care instructions adapted under license by [employer] (which disclaims liability or warranty for this information). If you have questions about a medical condition or this instruction, always ask your healthcare professional. [address] any warranty or liability for your use of this information. Liver Cancer: Care Instructions  Your Care Instructions    Liver cancer occurs when abnormal cells grow out of control in the liver. Many times the cancer starts at another part of the body, like the colon or lung, and spreads to the liver. Other times, the cancer starts in the liver. Treatment for liver cancer depends on what kind of cancer you have and how far it has spread. You may need surgery, radiation therapy, or chemotherapy. Another treatment uses different methods--radio waves, freezing, or injecting chemicals directly into the tumor--to kill cancer cells. You may need more than one of kind of treatment. If your cancer cannot be cured, the goal may be to remove or destroy as much of the tumor as possible. This can prevent cancer from growing, spreading, or returning for as long as possible. Treatment with chemotherapy or radiation can make you feel very tired and sick to your stomach and may cause diarrhea. It also can make your immune system weaker. This can raise your risk of infection. Finding out that you have cancer is scary. You may feel many emotions and may need some help coping. Seek out family, friends, and counselors for support.  You also can do things at home to make yourself feel better while you go through treatment. Call the [facility] ([phone]) or visit its website at [address] for more information. Follow-up care is a key part of your treatment and safety. Be sure to make and go to all appointments, and call your doctor if you are having problems. It's also a good idea to know your test results and keep a list of the medicines you take. How can you care for yourself at home? · Take your medicines exactly as prescribed. Call your doctor if you have any problems with your medicine. You may get medicine for nausea and vomiting if you have these side effects. · Eat healthy food. If you do not feel like eating, try to eat food that has protein and extra calories to keep up your strength and prevent weight loss. Drink liquid meal replacements for extra calories and protein. Try to eat your main meal early. Some people do better with small, frequent meals rather than one or two large ones. · Get some physical activity every day, but do not get too tired. Keep doing the hobbies you enjoy as your energy allows. · Take steps to control your stress and workload. Learn relaxation techniques. ¨ Share your feelings. Stress and tension affect our emotions. By expressing your feelings to others, you may be able to understand and cope with them. ¨ Consider joining a support group. Talking about a problem with your spouse, a good friend, or other people with similar problems is a good way to reduce tension and stress. ¨ Express yourself through art. Try writing, crafts, dance, or art to relieve stress. Some dance, writing, or art groups may be available just for people who have cancer. ¨ Be kind to your body and mind. Getting enough sleep, eating a healthy diet, and taking time to do things you enjoy can contribute to an overall feeling of balance in your life and can help reduce stress. ¨ Get help if you need it.  Discuss your concerns with your doctor, counselor, or other health professional.  · If you are vomiting or have diarrhea:  ¨ Drink plenty of fluids (enough so that your urine is light yellow or clear like water) to prevent dehydration. Choose water and other caffeine-free clear liquids. If you have kidney, heart, or liver disease and have to limit fluids, talk with your doctor before you increase the amount of fluids you drink. ¨ When you are able to eat, try clear soups, mild foods, and liquids until all symptoms are gone for 12 to 48 hours. Other good choices include dry toast, crackers, cooked cereal, and gelatin dessert, such as Jell-O.  · If you have not already done so, prepare a list of advance directives. Advance directives are instructions to your doctor and family members about what kind of care you want if you become unable to speak or express yourself. When should you call for help? Call 911 anytime you think you may need emergency care. For example, call if:  · You cough up blood. · You vomit blood or what looks like coffee grounds. · You pass maroon, black, or very bloody stools. Call your doctor now or seek immediate medical care if:  · You have any unusual bleeding, such as:  ¨ Blood spots under the skin. ¨ A nosebleed that you cannot stop. ¨ Bleeding gums when you brush your teeth. ¨ Blood in your urine. ¨ Vaginal bleeding when you are not having your period, or heavy period bleeding. · You have a fever, chills, or body aches. · You are very sleepy or confused. · You have nausea or vomiting that is not controlled by medicine. · Your pain is not controlled by medicine. Watch closely for changes in your health, and be sure to contact your doctor if:  · Your skin or the whites of your eyes turn yellow, or they are more yellow than they were before. · You are not able to eat well and are losing weight. · You feel more tired than usual.  · You feel very sad or anxious, or both.   · Your symptoms do not get better, or they get worse. Where can you learn more? Go to http://shahrzad-gume.info/. Enter P483 in the search box to learn more about \"Liver Cancer: Care Instructions. \"  Current as of: July 26, 2016  Content Version: 11.3  © 8041-8079 [email or web address]. Care instructions adapted under license by [employer] (which disclaims liability or warranty for this information). If you have questions about a medical condition or this instruction, always ask your healthcare professional. [address] any warranty or liability for your use of this information.

## 2017-06-19 NOTE — MR AVS SNAPSHOT
Visit Information Date & Time Provider Department Dept. Phone Encounter #  
 6/19/2017  3:30 PM Gwendolyn Witt MD [facility] [phone] [id number] Follow-up Instructions Return in about 4 weeks (around 7/17/2017). Your Appointments 7/17/2017  3:15 PM  
Office Visit with Gwendolyn Witt MD  
[facility]) Appt Note: 4 week follow up appointment [address] [place] [address], [address] [place] [address] Upcoming Health Maintenance Date Due HEMOGLOBIN A1C Q6M 1955 FOOT EXAM Q1 9/9/1963 MICROALBUMIN Q1 9/9/1963 EYE EXAM RETINAL OR DILATED Q1 9/9/1963 Pneumococcal 19-64 Highest Risk (1 of 3 - PCV13) 9/9/1972 DTaP/Tdap/Td series (1 - Tdap) 9/9/1974 FOBT Q 1 YEAR AGE 50-75 9/9/2003 ZOSTER VACCINE AGE 60> 9/9/2013 LIPID PANEL Q1 4/25/2015 INFLUENZA AGE 9 TO ADULT 8/1/2017 Allergies as of 6/19/2017  Review Complete On: 6/19/2017 By: Gwendolyn Witt MD  
  
 Severity Noted Reaction Type Reactions Lipitor [Atorvastatin]    Unable to Obtain Other reaction(s): other/intolerance Other reaction(s): other/intolerance Current Immunizations  Never Reviewed No immunizations on file. Not reviewed this visit You Were Diagnosed With   
  
 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)    -  Primary ICD-10-CM: C22.8 ICD-9-CM: 155.0 Cancer, hepatocellular ([facility] [address]     ICD-10-CM: C22.0 ICD-9-CM: 155.0 Cancer associated pain     ICD-10-CM: G89.3 ICD-9-CM: 338. 3 Chronic anemia     ICD-10-CM: D64.9 ICD-9-CM: 285.9 Loss of appetite     ICD-10-CM: R63.0 ICD-9-CM: 014. 0 Vitals BP Pulse Temp Weight(growth percentile) BMI Smoking Status 148/78 88 98.4 °F (36.9 °C) 170 lb (77.1 kg) 23.71 kg/m2 Former Smoker BMI and BSA Data Body Mass Index Body Surface Area  
 23.71 kg/m 2 1.97 m 2 Preferred Pharmacy Pharmacy Name Phone [phone], [address] Your Updated Medication List  
  
   
This list is accurate as of: 6/19/17  4:38 PM.  Always use your most recent med list.  
  
  
  
  
 aspirin delayed-release 81 mg tablet Take  by mouth daily. 2 tabs daily  
  
 b complex-vitamin c-folic acid 1 mg capsule Commonly known as:  Flako [place] Take 1 Cap by Mouth Once a Day. cephALEXin 250 mg capsule Commonly known as:  Melvenia Stabs Take 500 mg by mouth four (4) times daily. cloNIDine HCl 0.1 mg tablet Commonly known as:  CATAPRES  
take 2 tablets by mouth twice a day  
  
 clopidogrel 75 mg Tab Commonly known as:  PLAVIX TAKE 1 TABLET (75 MG) BY ORAL ROUTE ONCE DAILY  
  
 cyanocobalamin 1,000 mcg/mL injection Commonly known as:  VITAMIN B12  
1,000 mcg by IntraMUSCular route once. dronabinol 5 mg capsule Commonly known as:  Arlys Destiney Take 1 Cap by mouth two (2) times a day. Max Daily Amount: 10 mg.  
  
 ezetimibe 10 mg tablet Commonly known as:  Bonnie Dimes 10 mg.  
  
 * fentaNYL 25 mcg/hr PATCH Commonly known as:  DURAGESIC  
1 Patch by TransDERmal route every seventy-two (72) hours. Max Daily Amount: 1 Patch. * fentaNYL 100 mcg/hr PATCH Commonly known as:  DURAGESIC  
1 Patch by TransDERmal route every seventy-two (72) hours. Max Daily Amount: 1 Patch. * fentaNYL 100 mcg/hr PATCH Commonly known as:  DURAGESIC  
1 Patch by TransDERmal route every seventy-two (72) hours. Max Daily Amount: 1 Patch. * fentaNYL 50 mcg/hr PATCH Commonly known as:  DURAGESIC  
1 Patch by TransDERmal route every seventy-two (72) hours. Max Daily Amount: 1 Patch.  Use 1- 100 g patch in combination with 1-50 g patch for a total dose of 150 g every 72 hours * fentaNYL 75 mcg/hr Commonly known as:  DURAGESIC  
1 Patch by TransDERmal route every seventy-two (72) hours. Max Daily Amount: 1 Patch. * fentaNYL 100 mcg/hr PATCH Commonly known as:  DURAGESIC  
1 Patch by TransDERmal route every seventy-two (72) hours. Max Daily Amount: 1 Patch.  
  
 gabapentin 300 mg capsule Commonly known as:  NEURONTIN Take 300 mg by mouth two (2) times a day. hydrALAZINE 50 mg tablet Commonly known as:  APRESOLINE  
take 1 tablet by mouth twice a day HOLD DOSE IF SYSTOLIC BLOOD PRESSURE IS LESS THAN 120  
  
 hydroCHLOROthiazide 50 mg tablet Commonly known as:  HYDRODIURIL  
50 mg.  
  
 * HYDROmorphone 2 mg tablet Commonly known as:  DILAUDID Take 1 Tab by mouth every four (4) hours as needed for Pain. Max Daily Amount: 12 mg.  
  
 * HYDROmorphone 4 mg tablet Commonly known as:  DILAUDID Take 1 Tab by mouth every four (4) hours as needed for Pain. Max Daily Amount: 24 mg.  
  
 isosorbide mononitrate ER 60 mg CR tablet Commonly known as:  IMDUR Take 1 Tab by mouth every morning. losartan 100 mg tablet Commonly known as:  COZAAR Take 1 Tab by mouth daily. megestrol 400 mg/10 mL (40 mg/mL) suspension Commonly known as:  MEGACE Take 20 mL by mouth daily. metoprolol tartrate 100 mg IR tablet Commonly known as:  LOPRESSOR  
take 1 tablet by mouth twice a day NIFEDICAL XL 60 mg ER tablet Generic drug:  NIFEdipine ER  
TAKE 1 TABLET (60 MG) BY ORAL ROUTE ONCE DAILY  
  
 nitroglycerin 0.4 mg SL tablet Commonly known as:  NITROSTAT  
1 Tab by SubLINGual route every five (5) minutes as needed for Chest Pain for up to 3 doses. oxyCODONE IR 5 mg immediate release tablet Commonly known as:  Aurelio Osmel Take 5 mg by mouth every four (4) hours as needed for Pain.  
  
 pantoprazole 40 mg tablet Commonly known as:  PROTONIX  
40 mg.  
  
 penicillin v potassium 500 mg tablet Commonly known as:  VEETID PERCOCET 5-325 mg per tablet Generic drug:  oxyCODONE-acetaminophen Take 1 Tab by Mouth Every 4 Hours As Needed for Pain. Do not exceed 4000 mg of acetaminophen per day. rosuvastatin 5 mg tablet Commonly known as:  CRESTOR Take 1 Tab by mouth nightly. SORAfenib 200 mg tablet Commonly known as:  Lurlean [place] Take  by mouth two (2) times a day. traMADol 50 mg tablet Commonly known as:  ULTRAM  
Take 1 Tab by mouth every six (6) hours as needed for Pain. Max Daily Amount: 200 mg. Indications: PAIN  
  
 * Notice: This list has 8 medication(s) that are the same as other medications prescribed for you. Read the directions carefully, and ask your doctor or other care provider to review them with you. Prescriptions Printed Refills  
 fentaNYL (DURAGESIC) 75 mcg/hr 0 Si Patch by TransDERmal route every seventy-two (72) hours. Max Daily Amount: 1 Patch. Class: Print Route: TransDERmal  
 fentaNYL (DURAGESIC) 100 mcg/hr PATCH 0 Si Patch by TransDERmal route every seventy-two (72) hours. Max Daily Amount: 1 Patch. Class: Print Route: TransDERmal  
 HYDROmorphone (DILAUDID) 4 mg tablet 0 Sig: Take 1 Tab by mouth every four (4) hours as needed for Pain. Max Daily Amount: 24 mg. Class: Print Route: Oral  
  
We Performed the Following COMPLETE CBC & AUTO DIFF WBC [12852 CPT(R)] Follow-up Instructions Return in about 4 weeks (around 2017). To-Do List   
 2017 Lab:  AFP, TUMOR MARKER   
  
 2017 Lab:  CBC WITH 3 PART DIFF   
  
 2017 Lab:  FERRITIN   
  
 2017 Lab:  IRON PROFILE   
  
 2017 Lab:  METABOLIC PANEL, COMPREHENSIVE Patient Instructions Learning About Cancer Pain What is cancer pain?  
Cancer pain may be caused by the cancer or by the treatments and tests used. The pain may make it hard for you to do your normal activities, such as sleeping or eating. Over time, cancer pain can cause appetite and sleep problems, isolation, and depression. But most cancer pain can be managed with medicines and other methods. This may not mean that you have no pain but that it stays at a level that you can bear. Treating your pain will make you feel better. You will be more active, eat and sleep better, and enjoy your family and friends. What are some key points about cancer pain? · You are the only person who can say how much pain you have. If you tell your doctor when you have pain or when pain changes, your doctor can help you. · Cancer pain can almost always be relieved if you work with your doctor to create a treatment plan that is right for you. · Pain is often easier to control right after it starts. This may mean it is better to take regular doses instead of waiting until the pain becomes bad. · Take your medicines exactly as prescribed. Call your doctor if you think you are having a problem with your medicine. · You may find that taking your medicine works most of the time, but your pain flares up during extra activity or for no clear reason. This is called breakthrough pain. Ask your doctor what you can do if this happens. Your doctor can give you a prescription for fast-acting medicines that you can take for breakthrough pain. · People who take cancer pain medicines rarely become addicted to them. Your body may come to expect daily doses of medicine to control the pain. But your doctor can gradually lower the amount you are taking when and if the cause of your pain is gone. What treatments can help you manage cancer pain? Medical treatments to manage cancer pain include: · Prescription and over-the-counter medicines. Many types of medicines are used. Your doctor may suggest different combinations of medicines. · Surgery, chemotherapy, radiation, and hormone therapy. These may be used to remove or destroy a tumor that is causing pain. · Nerve blocks. These are used to help with nerve pain. A medicine is injected into the nerve that affects the painful area. Nonmedical treatments include: · Physical therapy, gentle massage, acupuncture, and heat or cold to ease pain. · Stretching, yoga, and exercises to help you keep your strength, flexibility, and mobility. · Relaxation, biofeedback, or meditation to relieve stress and anxiety. · Short-term crisis therapy with a counselor. This may help you manage your cancer pain or the discomfort from cancer treatments. · Education and emotional support. Learning as much as you can about your pain may help. So can sharing your feelings with others. A support group can be a safe and comfortable place to talk about your illness. · Complementary therapies, such as aromatherapy, prayer, and humor therapy, may be helpful. How can you manage cancer pain? Your doctor needs all the information you can give about what your pain feels like. It often helps to write things down in a pain diary. · Write down when your pain starts, what it feels like, and how long it lasts. Use words like dull, aching, sharp, shooting, throbbing, or burning. · Note changes in your pain. Is it constant, or does it come and go? Do you have more than one kind of pain? How long does it last? 
· Rate your pain on a scale of 0 to 10, with 0 being no pain and 10 being the worst pain you can imagine. · Write exactly where you feel pain. You can use a drawing. Say whether the pain is just in that one place or several places at once. Or tell your doctor if it travels from one place to another. · Write down what makes your pain better or worse. Note when you used a treatment, how well it worked, and any side effects.  
If you and your doctor are not able to control your pain, ask about seeing a pain specialist. A pain specialist is a health professional who focuses on treating resistant pain. Talk to your doctor if you are having problems with depression. Treating depression can make it easier to manage your cancer pain. Where can you learn more? Go to http://shahrzad-gume.info/. Enter K531 in the search box to learn more about \"Learning About Cancer Pain. \" Current as of: July 26, 2016 Content Version: 11.3 © 3222-4216 Jelli. Care instructions adapted under license by [employer] (which disclaims liability or warranty for this information). If you have questions about a medical condition or this instruction, always ask your healthcare professional. [place] [address] any warranty or liability for your use of this information. Liver Cancer: Care Instructions Your Care Instructions Liver cancer occurs when abnormal cells grow out of control in the liver. Many times the cancer starts at another part of the body, like the colon or lung, and spreads to the liver. Other times, the cancer starts in the liver. Treatment for liver cancer depends on what kind of cancer you have and how far it has spread. You may need surgery, radiation therapy, or chemotherapy. Another treatment uses different methodsradio waves, freezing, or injecting chemicals directly into the tumorto kill cancer cells. You may need more than one of kind of treatment. If your cancer cannot be cured, the goal may be to remove or destroy as much of the tumor as possible. This can prevent cancer from growing, spreading, or returning for as long as possible. Treatment with chemotherapy or radiation can make you feel very tired and sick to your stomach and may cause diarrhea. It also can make your immune system weaker. This can raise your risk of infection. Finding out that you have cancer is scary.  You may feel many emotions and may need some help coping. Seek out family, friends, and counselors for support. You also can do things at home to make yourself feel better while you go through treatment. Call the [facility] ([phone]) or visit its website at [address] [employer]. Slingbox for more information. Follow-up care is a key part of your treatment and safety. Be sure to make and go to all appointments, and call your doctor if you are having problems. It's also a good idea to know your test results and keep a list of the medicines you take. How can you care for yourself at home? · Take your medicines exactly as prescribed. Call your doctor if you have any problems with your medicine. You may get medicine for nausea and vomiting if you have these side effects. · Eat healthy food. If you do not feel like eating, try to eat food that has protein and extra calories to keep up your strength and prevent weight loss. Drink liquid meal replacements for extra calories and protein. Try to eat your main meal early. Some people do better with small, frequent meals rather than one or two large ones. · Get some physical activity every day, but do not get too tired. Keep doing the hobbies you enjoy as your energy allows. · Take steps to control your stress and workload. Learn relaxation techniques. ¨ Share your feelings. Stress and tension affect our emotions. By expressing your feelings to others, you may be able to understand and cope with them. ¨ Consider joining a support group. Talking about a problem with your spouse, a good friend, or other people with similar problems is a good way to reduce tension and stress. ¨ Express yourself through art. Try writing, crafts, dance, or art to relieve stress. Some dance, writing, or art groups may be available just for people who have cancer. ¨ Be kind to your body and mind.  Getting enough sleep, eating a healthy diet, and taking time to do things you enjoy can contribute to an overall feeling of balance in your life and can help reduce stress. ¨ Get help if you need it. Discuss your concerns with your doctor, counselor, or other health professional. 
· If you are vomiting or have diarrhea: ¨ Drink plenty of fluids (enough so that your urine is light yellow or clear like water) to prevent dehydration. Choose water and other caffeine-free clear liquids. If you have kidney, heart, or liver disease and have to limit fluids, talk with your doctor before you increase the amount of fluids you drink. ¨ When you are able to eat, try clear soups, mild foods, and liquids until all symptoms are gone for 12 to 48 hours. Other good choices include dry toast, crackers, cooked cereal, and gelatin dessert, such as Jell-O. · If you have not already done so, prepare a list of advance directives. Advance directives are instructions to your doctor and family members about what kind of care you want if you become unable to speak or express yourself. When should you call for help? Call 911 anytime you think you may need emergency care. For example, call if: 
· You cough up blood. · You vomit blood or what looks like coffee grounds. · You pass maroon, black, or very bloody stools. Call your doctor now or seek immediate medical care if: 
· You have any unusual bleeding, such as: ¨ Blood spots under the skin. ¨ A nosebleed that you cannot stop. ¨ Bleeding gums when you brush your teeth. ¨ Blood in your urine. ¨ Vaginal bleeding when you are not having your period, or heavy period bleeding. · You have a fever, chills, or body aches. · You are very sleepy or confused. · You have nausea or vomiting that is not controlled by medicine. · Your pain is not controlled by medicine. Watch closely for changes in your health, and be sure to contact your doctor if: 
· Your skin or the whites of your eyes turn yellow, or they are more yellow than they were before. · You are not able to eat well and are losing weight. · You feel more tired than usual. 
· You feel very sad or anxious, or both. · Your symptoms do not get better, or they get worse. Where can you learn more? Go to http://shahrzad-gume.info/. Enter P483 in the search box to learn more about \"Liver Cancer: Care Instructions. \" Current as of: July 26, 2016 Content Version: 11.3 © 2589-3732 [employer]. Care instructions adapted under license by [employer] (which disclaims liability or warranty for this information). If you have questions about a medical condition or this instruction, always ask your healthcare professional. [place] [address] any warranty or liability for your use of this information. Liver Cancer: Care Instructions Your Care Instructions Liver cancer occurs when abnormal cells grow out of control in the liver. Many times the cancer starts at another part of the body, like the colon or lung, and spreads to the liver. Other times, the cancer starts in the liver. Treatment for liver cancer depends on what kind of cancer you have and how far it has spread. You may need surgery, radiation therapy, or chemotherapy. Another treatment uses different methodsradio waves, freezing, or injecting chemicals directly into the tumorto kill cancer cells. You may need more than one of kind of treatment. If your cancer cannot be cured, the goal may be to remove or destroy as much of the tumor as possible. This can prevent cancer from growing, spreading, or returning for as long as possible. Treatment with chemotherapy or radiation can make you feel very tired and sick to your stomach and may cause diarrhea. It also can make your immune system weaker. This can raise your risk of infection. Finding out that you have cancer is scary. You may feel many emotions and may need some help coping.  Seek out family, friends, and counselors for support. You also can do things at home to make yourself feel better while you go through treatment. Call the [facility] ([phone]) or visit its website at [address] [employer]. UpNext for more information. Follow-up care is a key part of your treatment and safety. Be sure to make and go to all appointments, and call your doctor if you are having problems. It's also a good idea to know your test results and keep a list of the medicines you take. How can you care for yourself at home? · Take your medicines exactly as prescribed. Call your doctor if you have any problems with your medicine. You may get medicine for nausea and vomiting if you have these side effects. · Eat healthy food. If you do not feel like eating, try to eat food that has protein and extra calories to keep up your strength and prevent weight loss. Drink liquid meal replacements for extra calories and protein. Try to eat your main meal early. Some people do better with small, frequent meals rather than one or two large ones. · Get some physical activity every day, but do not get too tired. Keep doing the hobbies you enjoy as your energy allows. · Take steps to control your stress and workload. Learn relaxation techniques. ¨ Share your feelings. Stress and tension affect our emotions. By expressing your feelings to others, you may be able to understand and cope with them. ¨ Consider joining a support group. Talking about a problem with your spouse, a good friend, or other people with similar problems is a good way to reduce tension and stress. ¨ Express yourself through art. Try writing, crafts, dance, or art to relieve stress. Some dance, writing, or art groups may be available just for people who have cancer. ¨ Be kind to your body and mind. Getting enough sleep, eating a healthy diet, and taking time to do things you enjoy can contribute to an overall feeling of balance in your life and can help reduce stress. ¨ Get help if you need it. Discuss your concerns with your doctor, counselor, or other health professional. 
· If you are vomiting or have diarrhea: ¨ Drink plenty of fluids (enough so that your urine is light yellow or clear like water) to prevent dehydration. Choose water and other caffeine-free clear liquids. If you have kidney, heart, or liver disease and have to limit fluids, talk with your doctor before you increase the amount of fluids you drink. ¨ When you are able to eat, try clear soups, mild foods, and liquids until all symptoms are gone for 12 to 48 hours. Other good choices include dry toast, crackers, cooked cereal, and gelatin dessert, such as Jell-O. · If you have not already done so, prepare a list of advance directives. Advance directives are instructions to your doctor and family members about what kind of care you want if you become unable to speak or express yourself. When should you call for help? Call 911 anytime you think you may need emergency care. For example, call if: 
· You cough up blood. · You vomit blood or what looks like coffee grounds. · You pass maroon, black, or very bloody stools. Call your doctor now or seek immediate medical care if: 
· You have any unusual bleeding, such as: ¨ Blood spots under the skin. ¨ A nosebleed that you cannot stop. ¨ Bleeding gums when you brush your teeth. ¨ Blood in your urine. ¨ Vaginal bleeding when you are not having your period, or heavy period bleeding. · You have a fever, chills, or body aches. · You are very sleepy or confused. · You have nausea or vomiting that is not controlled by medicine. · Your pain is not controlled by medicine. Watch closely for changes in your health, and be sure to contact your doctor if: 
· Your skin or the whites of your eyes turn yellow, or they are more yellow than they were before. · You are not able to eat well and are losing weight.  
· You feel more tired than usual. 
 · You feel very sad or anxious, or both. · Your symptoms do not get better, or they get worse. Where can you learn more? Go to http://shahrzad-gume.info/. Enter P483 in the search box to learn more about \"Liver Cancer: Care Instructions. \" Current as of: July 26, 2016 Content Version: 11.3 © 4196-2154 Proviation. Care instructions adapted under license by [employer] (which disclaims liability or warranty for this information). If you have questions about a medical condition or this instruction, always ask your healthcare professional. [address] any warranty or liability for your use of this information. Introducing [facility] [place] [facility]! [employer] introduces [facility] [facility] patient portal. Now you can access parts of your medical record, email your doctor's office, and request medication refills online. 1. In your internet browser, go to [email or web address] 2. Click on the First Time User? Click Here link in the Sign In box. You will see the New Member Sign Up page. 3. Enter your [facility] Access Code exactly as it appears below. You will not need to use this code after youve completed the sign-up process. If you do not sign up before the expiration date, you must request a new code. · [facility] Access Code: [email or web address] Expires: 8/23/2017 11:50 AM 
 
4. Enter the last four digits of your Social Security Number (xxxx) and Date of Birth (mm/dd/yyyy) as indicated and click Submit. You will be taken to the next sign-up page. 5. Create a SeeSaw Networkst ID. This will be your [facility] Goodness login ID and cannot be changed, so think of one that is secure and easy to remember. 6. Create a [employer] password. You can change your password at any time. 7. Enter your Password Reset Question and Answer. This can be used at a later time if you forget your password. 8. Enter your e-mail address.  You will receive e-mail notification when new information is available in [employer]. 9. Click Sign Up. You can now view and download portions of your medical record. 10. Click the Download Summary menu link to download a portable copy of your medical information. If you have questions, please visit the Frequently Asked Questions section of the [employer] website. Remember, [employer] is NOT to be used for urgent needs. For medical emergencies, dial 911. Now available from your iPhone and Android! Please provide this summary of care documentation to your next provider. Your primary care clinician is listed as Melanie Mcallister. If you have any questions after today's visit, please call [phone].

## 2017-06-19 NOTE — PROGRESS NOTES
Hematology/Oncology  Progress Note    Name: Consuelo Zimmer  Date: 3/27/2017  : 1955    PCP: Zari Maloney MD     Mr. Pierre Roman is a 59 y.o.  male who was seen for management of his progressive hepatocellular carcinoma. Current therapy: Stivarga 160 mg PO daily x 21 days on and 7 days off every 28 days. Subjective:      Mr. Milo Prasad is a 58-year-old -American man who has slowly progressive advanced hepatocellular carcinoma. He has previously been treated with Nexavar. This medicine was discontinued due to the progressive decline in his cardiac function. He was recently to the interventional radiologist to see if he would be a candidate for radiofrequency ablation. He had a couple of treatment and that has been discontinued due to the patient being too weak for treatment. The patient has significant change in his performance status and was told that this precludes any additional attempts at this treatment modality. The patient has been offered palliative management in the setting of the hospice program but he states that he is still hoping to find a cure. He is requesting for a new prescription of his Duragesic patch. He has no other complaints to report at this time. Past medical history, family history, and social history: these were reviewed and remains unchanged.     Past Medical History:   Diagnosis Date    Acid reflux     Angina effort ([facility] [address] 10/20/2015    rare, sply when misses meds and BP high     Cancer, hepatocellular ([facility] [address] 3/6/2015    Coronary artery disease     Coronary atherosclerosis of native coronary artery     RCA PCI, NOT ON STATIN DUE TO HEP C AND ELEVATED LFT    Essential hypertension     Essential hypertension, benign     ELEVATED/PT HAS NOT TAKEN MEDS TODAY    Other and unspecified angina pectoris     STABLE NOW, FEELS BETTER    Postsurgical percutaneous transluminal coronary angioplasty status     POST RCA JAMES/2011, D/C EFFIENT    Type II or unspecified type diabetes mellitus without mention of complication, not stated as uncontrolled      Past Surgical History:   Procedure Laterality Date    HX CORONARY STENT PLACEMENT  2006    RCA STENT, FOR SINGLE VESSEL CAD    HX CORONARY STENT PLACEMENT  2011    RCA: JAMES    HX HEART CATHETERIZATION      HX TONSILLECTOMY       Social History     Social History    Marital status: SINGLE     Spouse name: N/A    Number of children: N/A    Years of education: N/A     Occupational History    Not on file. Social History Main Topics    Smoking status: Former Smoker     Quit date: 4/23/2006    Smokeless tobacco: Never Used      Comment: REMOTELY QUIT TOBACCO USE    Alcohol use No      Comment: REMOTELY QUIT ALCHOL USE    Drug use: No    Sexual activity: Not on file     Other Topics Concern    Not on file     Social History Narrative     Family History   Problem Relation Age of Onset    Heart Disease Father      MI    Heart Attack Father 79    Hypertension Other     Diabetes Other     Stroke Neg Hx      Current Outpatient Prescriptions   Medication Sig Dispense Refill    fentaNYL (DURAGESIC) 75 mcg/hr 1 Patch by TransDERmal route every seventy-two (72) hours. Max Daily Amount: 1 Patch. 5 Patch 0    fentaNYL (DURAGESIC) 100 mcg/hr PATCH 1 Patch by TransDERmal route every seventy-two (72) hours. Max Daily Amount: 1 Patch. 5 Patch 0    HYDROmorphone (DILAUDID) 4 mg tablet Take 1 Tab by mouth every four (4) hours as needed for Pain. Max Daily Amount: 24 mg. 180 Tab 0    fentaNYL (DURAGESIC) 100 mcg/hr PATCH 1 Patch by TransDERmal route every seventy-two (72) hours. Max Daily Amount: 1 Patch. 10 Patch 0    fentaNYL (DURAGESIC) 50 mcg/hr PATCH 1 Patch by TransDERmal route every seventy-two (72) hours. Max Daily Amount: 1 Patch.  Use 1- 100 µg patch in combination with 1-50 µg patch for a total dose of 150 µg every 72 hours 10 Patch 0    fentaNYL (DURAGESIC) 100 mcg/hr PATCH 1 Patch by TransDERmal route every seventy-two (72) hours. Max Daily Amount: 1 Patch. 10 Patch 0    cloNIDine HCl (CATAPRES) 0.1 mg tablet take 2 tablets by mouth twice a day 60 Tab 6    hydrALAZINE (APRESOLINE) 50 mg tablet take 1 tablet by mouth twice a day HOLD DOSE IF SYSTOLIC BLOOD PRESSURE IS LESS THAN 120 60 Tab 6    metoprolol tartrate (LOPRESSOR) 100 mg IR tablet take 1 tablet by mouth twice a day 60 Tab 3    megestrol (MEGACE) 400 mg/10 mL (40 mg/mL) suspension Take 20 mL by mouth daily. 480 mL 12    dronabinol (MARINOL) 5 mg capsule Take 1 Cap by mouth two (2) times a day. Max Daily Amount: 10 mg. 60 Cap 3    cyanocobalamin (VITAMIN B12) 1,000 mcg/mL injection 1,000 mcg by IntraMUSCular route once.  nitroglycerin (NITROSTAT) 0.4 mg SL tablet 1 Tab by SubLINGual route every five (5) minutes as needed for Chest Pain for up to 3 doses. 25 Tab 0    traMADol (ULTRAM) 50 mg tablet Take 1 Tab by mouth every six (6) hours as needed for Pain. Max Daily Amount: 200 mg. Indications: PAIN 120 Tab 0    isosorbide mononitrate ER (IMDUR) 60 mg CR tablet Take 1 Tab by mouth every morning. 30 Tab 3    clopidogrel (PLAVIX) 75 mg tablet TAKE 1 TABLET (75 MG) BY ORAL ROUTE ONCE DAILY 30 Tab 11    b complex-vitamin c-folic acid (NEPHROCAPS) 1 mg capsule Take 1 Cap by Mouth Once a Day.  oxyCODONE-acetaminophen (PERCOCET) 5-325 mg per tablet Take 1 Tab by Mouth Every 4 Hours As Needed for Pain. Do not exceed 4000 mg of acetaminophen per day.  ezetimibe (ZETIA) 10 mg tablet 10 mg.      hydroCHLOROthiazide (HYDRODIURIL) 50 mg tablet 50 mg.  pantoprazole (PROTONIX) 40 mg tablet 40 mg.  penicillin v potassium (VEETID) 500 mg tablet   0    fentaNYL (DURAGESIC) 25 mcg/hr PATCH 1 Patch by TransDERmal route every seventy-two (72) hours. Max Daily Amount: 1 Patch. 10 Patch 0    HYDROmorphone (DILAUDID) 2 mg tablet Take 1 Tab by mouth every four (4) hours as needed for Pain.  Max Daily Amount: 12 mg. 180 Tab 0    cephALEXin (Fred Demarco) 250 mg capsule Take 500 mg by mouth four (4) times daily.  oxyCODONE IR (ROXICODONE) 5 mg immediate release tablet Take 5 mg by mouth every four (4) hours as needed for Pain.  aspirin delayed-release 81 mg tablet Take  by mouth daily. 2 tabs daily      rosuvastatin (CRESTOR) 5 mg tablet Take 1 Tab by mouth nightly. 30 Tab 6    gabapentin (NEURONTIN) 300 mg capsule Take 300 mg by mouth two (2) times a day.  NIFEDICAL XL 60 mg ER tablet TAKE 1 TABLET (60 MG) BY ORAL ROUTE ONCE DAILY 30 Tab 3    SORAfenib (NEXAVAR) 200 mg tablet Take  by mouth two (2) times a day.  losartan (COZAAR) 100 mg tablet Take 1 Tab by mouth daily. 30 Tab 6       Review of Systems  Constitutional: The patient has complaints of some fatigue and weakness due to the cancer associated pain. HEENT: The patient denies recent head trauma, eye pain, blurred vision,  hearing deficit, oropharyngeal mucosal pain or lesions, and the patient denies throat pain or discomfort. Lymphatics: The patient denies palpable peripheral lymphadenopathy. Hematologic: The patient denies having bruising, bleeding, or progressive fatigue. Respiratory: Patient denies having shortness of breath, cough, sputum production, fever, or dyspnea on exertion. Cardiovascular: The patient denies having leg pain, leg swelling, heart palpitations, chest permit, chest pain, or lightheadedness. The patient denies having dyspnea on exertion. Gastrointestinal: The patient denies having nausea, emesis, or diarrhea. The patient denies having any hematemesis or blood in the stool. He is experiencing slowly progressive abdominal pain due to the progressive nature of his advanced hepatocellular carcinoma. Genitourinary: Patient denies having urinary urgency, frequency, or dysuria. The patient denies having blood in the urine. Psychological: The patient denies having symptoms of nervousness, anxiety, depression, or thoughts of harming self.   Skin: Patient denies having skin rashes, skin, ulcerations, or unexplained itching or pruritus. Musculoskeletal: The patient denies having pain in the joints or bones. Objective:     Visit Vitals    /78    Pulse 88    Temp 98.4 °F (36.9 °C)    Wt 77.1 kg (170 lb)    BMI 23.71 kg/m2     ECOG PS=1, Pain score=4/10    Physical Exam:   Gen. Appearance: The patient is in no acute distress. Skin: There is no bruise or rash. HEENT: The exam is unremarkable. Neck: Supple without lymphadenopathy or thyromegaly. Lungs: Clear to auscultation and percussion; there are no wheezes or rhonchi. Heart: Regular rate and rhythm; there are no murmurs, gallops, or rubs. Abdomen: Bowel sounds are present and normal.  There is no guarding, tenderness, or hepatosplenomegaly. Extremities: There is no clubbing, cyanosis, or edema. Neurologic: There are no focal neurologic deficits. Lymphatics: There is no palpable peripheral lymphadenopathy. Musculoskeletal: The patient has full range of motion at all joints. There is no evidence of joint deformity or effusions. There is no focal joint tenderness. Psychological/psychiatric: There is no clinical evidence of anxiety, depression, or melancholy. Lab data:      Results for orders placed or performed during the hospital encounter of 06/19/17   CBC WITH 3 PART DIFF     Status: Abnormal   Result Value Ref Range Status    WBC 3.4 (L) 4.5 - 13.0 K/uL Final    RBC 3.99 (L) 4.10 - 5.10 M/uL Final    HGB 10.0 (L) 12.0 - 16 g/dL Final    HCT 33.2 (L) 36 - 48 % Final    MCV 83.2 78 - 102 FL Final    MCH 25.1 25.0 - 35.0 PG Final    MCHC 30.1 (L) 31 - 37 g/dL Final    RDW 15.4 (H) 11.5 - 14.5 % Final    PLATELET 986 (L) 113 - 440 K/uL Final    NEUTROPHILS 74 (H) 40 - 70 % Final    MIXED CELLS 10 0.1 - 17 % Final    LYMPHOCYTES 15 14 - 44 % Final    ABS. NEUTROPHILS 2.5 1.8 - 9.5 K/UL Final    ABS. MIXED CELLS 0.4 0.0 - 2.3 K/uL Final    ABS.  LYMPHOCYTES 0.5 (L) 1.1 - 5.9 K/UL Final     Comment: [facility]. Results Reviewed by Medical Director. DF AUTOMATED   Final           Assessment:     1. Primary malignant neoplasm of liver with metastasis from liver to other site [facility])    2. Cancer, hepatocellular ([facility] [address]    3. Cancer associated pain    4. Chronic anemia    5. Loss of appetite      Plan:   Advanced hepatocellular carcinoma/primary malignant neoplasm of the liver with metastasis from the liver to other sites:  He was offered the option of hospice but declined. He is in the process of trying to get a consultative evaluation with Marcus Espinosa. Patient have been previously informed during his prior office visit that hospice would be his best option of management at this time. Today patient states he still wishes to think over this option further before committing to this management strategy. I have explained to the patient that the FDA has recently approved regorafenib as a treatment for progressive hepatocellular carcinoma. The dose is 160 mg daily for 21 days out of each 28 day treatment cycle. I discussed with the patient the risk, benefits, and potential side effects of the medication and he has given his verbal consent to receive treatment. Therefore, we will request a medication through the specialty pharmacy and once the medicine is authorized we will begin his treatment as per the FDA guidelines. A comprehensive metabolic panel and fetoprotein levels will be obtained at this time. Cancer associated pain: The patient will have his Duragesic dose increased to 175 mcg every 72 hours. Dilaudid 4 mg tablets will be provided as well with instruction to take 1 tablet every 4 hours when necessary as needed as well. I have provided the patient with new prescriptions for Dilaudid 4 mg tablets, Duragesic 100 mcg patches and Duragesic 75 mcg patches.     Chronic anemia: Have explained to the patient that his CBC today shows that his hemoglobin is 10.0 g/dL with hematocrit of 33.2 %. I will check his iron profile and ferritin levels at this time. Loss of appetite: I have explained to the patient that he should continue using nutritional supplements between meals such as boost or Ensure. Additionally,he will continue to use his Marinol 5 mg p.o. twice daily as an option to enhance his appetite and boost weight gain. Follow-up will occur in 4weeks. Orders Placed This Encounter    COMPLETE CBC & AUTO DIFF WBC    InHouse CBC ([facility])     Standing Status:   Future     Number of Occurrences:   1     Standing Expiration Date:   2017    FERRITIN     Standing Status:   Future     Standing Expiration Date:   2018    IRON PROFILE     Standing Status:   Future     Standing Expiration Date:   3/14/4232    METABOLIC PANEL, COMPREHENSIVE     Standing Status:   Future     Standing Expiration Date:   2018    AFP, TUMOR MARKER     Standing Status:   Future     Standing Expiration Date:   2018     Order Specific Question:   Patient Race? Answer:   Black     Order Specific Question:   Patient Weight     Answer:   77.1    fentaNYL (DURAGESIC) 75 mcg/hr     Si Patch by TransDERmal route every seventy-two (72) hours. Max Daily Amount: 1 Patch. Dispense:  5 Patch     Refill:  0    fentaNYL (DURAGESIC) 100 mcg/hr PATCH     Si Patch by TransDERmal route every seventy-two (72) hours. Max Daily Amount: 1 Patch. Dispense:  5 Patch     Refill:  0    HYDROmorphone (DILAUDID) 4 mg tablet     Sig: Take 1 Tab by mouth every four (4) hours as needed for Pain. Max Daily Amount: 24 mg. Dispense:  180 Tab     Refill:  0       Julieann Canavan, MD  2017      Please note: This document has been produced using voice recognition software. Unrecognized errors in transcription may be present.

## 2017-06-21 LAB
AFP-TM SERPL-MCNC: 44.8 NG/ML (ref 0–8.3)
ALBUMIN SERPL-MCNC: 4 G/DL (ref 3.6–4.8)
ALBUMIN/GLOB SERPL: 1.1 {RATIO} (ref 1.2–2.2)
ALP SERPL-CCNC: 74 IU/L (ref 39–117)
ALT SERPL-CCNC: 21 IU/L (ref 0–44)
AST SERPL-CCNC: 42 IU/L (ref 0–40)
BILIRUB SERPL-MCNC: 0.3 MG/DL (ref 0–1.2)
BUN SERPL-MCNC: 16 MG/DL (ref 8–27)
BUN/CREAT SERPL: 23 (ref 10–24)
CALCIUM SERPL-MCNC: 9.5 MG/DL (ref 8.6–10.2)
CHLORIDE SERPL-SCNC: 98 MMOL/L (ref 96–106)
CO2 SERPL-SCNC: 27 MMOL/L (ref 18–29)
CREAT SERPL-MCNC: 0.7 MG/DL (ref 0.76–1.27)
FERRITIN SERPL-MCNC: 127 NG/ML (ref 30–400)
GLOBULIN SER CALC-MCNC: 3.8 G/DL (ref 1.5–4.5)
GLUCOSE SERPL-MCNC: 103 MG/DL (ref 65–99)
IRON SATN MFR SERPL: 10 % (ref 15–55)
IRON SERPL-MCNC: 30 UG/DL (ref 38–169)
POTASSIUM SERPL-SCNC: 4.3 MMOL/L (ref 3.5–5.2)
PROT SERPL-MCNC: 7.8 G/DL (ref 6–8.5)
SODIUM SERPL-SCNC: 140 MMOL/L (ref 134–144)
TIBC SERPL-MCNC: 303 UG/DL (ref 250–450)
UIBC SERPL-MCNC: 273 UG/DL (ref 111–343)

## 2017-06-26 RX ORDER — ISOSORBIDE MONONITRATE 60 MG/1
TABLET, EXTENDED RELEASE ORAL
Qty: 30 TAB | Refills: 0 | Status: SHIPPED | OUTPATIENT
Start: 2017-06-26

## 2017-07-19 DIAGNOSIS — I47.1 SVT (SUPRAVENTRICULAR TACHYCARDIA) (HCC): ICD-10-CM

## 2017-07-19 RX ORDER — METOPROLOL TARTRATE 100 MG/1
TABLET ORAL
Qty: 60 TAB | Refills: 2 | Status: SHIPPED | OUTPATIENT
Start: 2017-07-19